# Patient Record
Sex: FEMALE | Race: ASIAN | NOT HISPANIC OR LATINO | Employment: UNEMPLOYED | URBAN - METROPOLITAN AREA
[De-identification: names, ages, dates, MRNs, and addresses within clinical notes are randomized per-mention and may not be internally consistent; named-entity substitution may affect disease eponyms.]

---

## 2023-06-08 ENCOUNTER — LAB REQUISITION (OUTPATIENT)
Dept: LAB | Facility: CLINIC | Age: 23
End: 2023-06-08

## 2023-06-08 DIAGNOSIS — Z34.90 ENCOUNTER FOR SUPERVISION OF NORMAL PREGNANCY, UNSPECIFIED, UNSPECIFIED TRIMESTER: ICD-10-CM

## 2023-06-08 LAB
BASOPHILS # BLD AUTO: 0 10E3/UL (ref 0–0.2)
BASOPHILS NFR BLD AUTO: 0 %
EOSINOPHIL # BLD AUTO: 0.2 10E3/UL (ref 0–0.7)
EOSINOPHIL NFR BLD AUTO: 3 %
ERYTHROCYTE [DISTWIDTH] IN BLOOD BY AUTOMATED COUNT: 12.6 % (ref 10–15)
HCT VFR BLD AUTO: 37.8 % (ref 35–47)
HGB BLD-MCNC: 12.4 G/DL (ref 11.7–15.7)
IMM GRANULOCYTES # BLD: 0 10E3/UL
IMM GRANULOCYTES NFR BLD: 0 %
LYMPHOCYTES # BLD AUTO: 2.2 10E3/UL (ref 0.8–5.3)
LYMPHOCYTES NFR BLD AUTO: 29 %
MCH RBC QN AUTO: 28.7 PG (ref 26.5–33)
MCHC RBC AUTO-ENTMCNC: 32.8 G/DL (ref 31.5–36.5)
MCV RBC AUTO: 88 FL (ref 78–100)
MONOCYTES # BLD AUTO: 0.5 10E3/UL (ref 0–1.3)
MONOCYTES NFR BLD AUTO: 7 %
NEUTROPHILS # BLD AUTO: 4.6 10E3/UL (ref 1.6–8.3)
NEUTROPHILS NFR BLD AUTO: 61 %
NRBC # BLD AUTO: 0 10E3/UL
NRBC BLD AUTO-RTO: 0 /100
PLATELET # BLD AUTO: 288 10E3/UL (ref 150–450)
RBC # BLD AUTO: 4.32 10E6/UL (ref 3.8–5.2)
WBC # BLD AUTO: 7.6 10E3/UL (ref 4–11)

## 2023-06-08 PROCEDURE — 87340 HEPATITIS B SURFACE AG IA: CPT | Performed by: STUDENT IN AN ORGANIZED HEALTH CARE EDUCATION/TRAINING PROGRAM

## 2023-06-08 PROCEDURE — 87086 URINE CULTURE/COLONY COUNT: CPT | Performed by: STUDENT IN AN ORGANIZED HEALTH CARE EDUCATION/TRAINING PROGRAM

## 2023-06-08 PROCEDURE — 86762 RUBELLA ANTIBODY: CPT | Performed by: STUDENT IN AN ORGANIZED HEALTH CARE EDUCATION/TRAINING PROGRAM

## 2023-06-08 PROCEDURE — 87389 HIV-1 AG W/HIV-1&-2 AB AG IA: CPT | Performed by: STUDENT IN AN ORGANIZED HEALTH CARE EDUCATION/TRAINING PROGRAM

## 2023-06-08 PROCEDURE — 86850 RBC ANTIBODY SCREEN: CPT | Performed by: STUDENT IN AN ORGANIZED HEALTH CARE EDUCATION/TRAINING PROGRAM

## 2023-06-08 PROCEDURE — 86901 BLOOD TYPING SEROLOGIC RH(D): CPT | Performed by: STUDENT IN AN ORGANIZED HEALTH CARE EDUCATION/TRAINING PROGRAM

## 2023-06-08 PROCEDURE — 86803 HEPATITIS C AB TEST: CPT | Performed by: STUDENT IN AN ORGANIZED HEALTH CARE EDUCATION/TRAINING PROGRAM

## 2023-06-08 PROCEDURE — 80081 OBSTETRIC PANEL INC HIV TSTG: CPT | Performed by: STUDENT IN AN ORGANIZED HEALTH CARE EDUCATION/TRAINING PROGRAM

## 2023-06-09 LAB
ABO/RH(D): NORMAL
ANTIBODY SCREEN: NEGATIVE
HBV SURFACE AG SERPL QL IA: NONREACTIVE
HCV AB SERPL QL IA: NONREACTIVE
HIV 1+2 AB+HIV1 P24 AG SERPL QL IA: NONREACTIVE
RPR SER QL: NONREACTIVE
RUBV IGG SERPL QL IA: 25.1 INDEX
RUBV IGG SERPL QL IA: POSITIVE
SPECIMEN EXPIRATION DATE: NORMAL

## 2023-06-10 LAB — BACTERIA UR CULT: NO GROWTH

## 2023-06-20 ENCOUNTER — MEDICAL CORRESPONDENCE (OUTPATIENT)
Dept: HEALTH INFORMATION MANAGEMENT | Facility: CLINIC | Age: 23
End: 2023-06-20

## 2023-08-04 ENCOUNTER — TRANSFERRED RECORDS (OUTPATIENT)
Dept: HEALTH INFORMATION MANAGEMENT | Facility: CLINIC | Age: 23
End: 2023-08-04
Payer: COMMERCIAL

## 2023-08-07 ENCOUNTER — TRANSCRIBE ORDERS (OUTPATIENT)
Dept: MATERNAL FETAL MEDICINE | Facility: CLINIC | Age: 23
End: 2023-08-07

## 2023-08-07 ENCOUNTER — PRE VISIT (OUTPATIENT)
Dept: MATERNAL FETAL MEDICINE | Facility: CLINIC | Age: 23
End: 2023-08-07
Payer: COMMERCIAL

## 2023-08-07 DIAGNOSIS — O26.90 PREGNANCY RELATED CONDITION, ANTEPARTUM: Primary | ICD-10-CM

## 2023-08-09 ENCOUNTER — OFFICE VISIT (OUTPATIENT)
Dept: MATERNAL FETAL MEDICINE | Facility: CLINIC | Age: 23
End: 2023-08-09
Attending: OBSTETRICS & GYNECOLOGY
Payer: COMMERCIAL

## 2023-08-09 ENCOUNTER — MEDICAL CORRESPONDENCE (OUTPATIENT)
Dept: HEALTH INFORMATION MANAGEMENT | Facility: CLINIC | Age: 23
End: 2023-08-09

## 2023-08-09 ENCOUNTER — HOSPITAL ENCOUNTER (OUTPATIENT)
Dept: ULTRASOUND IMAGING | Facility: CLINIC | Age: 23
Discharge: HOME | End: 2023-08-09
Attending: OBSTETRICS & GYNECOLOGY
Payer: COMMERCIAL

## 2023-08-09 VITALS — DIASTOLIC BLOOD PRESSURE: 61 MMHG | HEART RATE: 68 BPM | SYSTOLIC BLOOD PRESSURE: 108 MMHG

## 2023-08-09 DIAGNOSIS — O26.90 PREGNANCY RELATED CONDITION, ANTEPARTUM: ICD-10-CM

## 2023-08-09 DIAGNOSIS — O28.3 ABNORMAL PRENATAL ULTRASOUND: ICD-10-CM

## 2023-08-09 DIAGNOSIS — O35.9XX0 FETAL ABNORMALITY AFFECTING MANAGEMENT OF MOTHER, SINGLE OR UNSPECIFIED FETUS: Primary | ICD-10-CM

## 2023-08-09 DIAGNOSIS — O36.5920 POOR CLINICAL FETAL GROWTH IN SECOND TRIMESTER, SINGLE OR UNSPECIFIED FETUS: Primary | ICD-10-CM

## 2023-08-09 DIAGNOSIS — O28.3 ABNORMAL PRENATAL ULTRASOUND: Primary | ICD-10-CM

## 2023-08-09 DIAGNOSIS — O35.9XX0 FETAL ABNORMALITY AFFECTING MANAGEMENT OF MOTHER, SINGLE OR UNSPECIFIED FETUS: ICD-10-CM

## 2023-08-09 PROCEDURE — 87798 DETECT AGENT NOS DNA AMP: CPT | Performed by: OBSTETRICS & GYNECOLOGY

## 2023-08-09 PROCEDURE — 88291 CYTO/MOLECULAR REPORT: CPT | Performed by: MEDICAL GENETICS

## 2023-08-09 PROCEDURE — 99204 OFFICE O/P NEW MOD 45 MIN: CPT | Mod: 25 | Performed by: OBSTETRICS & GYNECOLOGY

## 2023-08-09 PROCEDURE — 88271 CYTOGENETICS DNA PROBE: CPT | Performed by: OBSTETRICS & GYNECOLOGY

## 2023-08-09 PROCEDURE — 88275 CYTOGENETICS 100-300: CPT | Mod: XU | Performed by: OBSTETRICS & GYNECOLOGY

## 2023-08-09 PROCEDURE — 76946 ECHO GUIDE FOR AMNIOCENTESIS: CPT | Mod: 26 | Performed by: OBSTETRICS & GYNECOLOGY

## 2023-08-09 PROCEDURE — 59025 FETAL NON-STRESS TEST: CPT

## 2023-08-09 PROCEDURE — 87496 CYTOMEG DNA AMP PROBE: CPT | Mod: XU | Performed by: OBSTETRICS & GYNECOLOGY

## 2023-08-09 PROCEDURE — 76828 ECHO EXAM OF FETAL HEART: CPT | Mod: 26 | Performed by: OBSTETRICS & GYNECOLOGY

## 2023-08-09 PROCEDURE — 81265 STR MARKERS SPECIMEN ANAL: CPT | Performed by: OBSTETRICS & GYNECOLOGY

## 2023-08-09 PROCEDURE — 36415 COLL VENOUS BLD VENIPUNCTURE: CPT | Performed by: OBSTETRICS & GYNECOLOGY

## 2023-08-09 PROCEDURE — 76811 OB US DETAILED SNGL FETUS: CPT | Mod: 26 | Performed by: OBSTETRICS & GYNECOLOGY

## 2023-08-09 PROCEDURE — 76811 OB US DETAILED SNGL FETUS: CPT

## 2023-08-09 PROCEDURE — 76946 ECHO GUIDE FOR AMNIOCENTESIS: CPT

## 2023-08-09 PROCEDURE — 59000 AMNIOCENTESIS DIAGNOSTIC: CPT | Performed by: OBSTETRICS & GYNECOLOGY

## 2023-08-09 PROCEDURE — 96040 HC GENETIC COUNSELING, EACH 30 MINUTES: CPT | Performed by: GENETIC COUNSELOR, MS

## 2023-08-09 PROCEDURE — 59025 FETAL NON-STRESS TEST: CPT | Mod: 26 | Performed by: OBSTETRICS & GYNECOLOGY

## 2023-08-09 NOTE — PROGRESS NOTES
Please see full imaging report from ViewPoint program under imaging tab.    Thank-you for referring your patient for a comprehensive ultrasound.    I discussed the findings on today's ultrasound with the patient. I reviewed the limitations of ultrasound both in detecting aneuploidy and structural abnormalities.  Ultrasound can routinely detect 80-90% of structural abnormalities.  She had low risk cell free fetal DNA for genetic screening this pregnancy.     The findings on today's ultrasound are consistent with fetal growth restriction (FGR). We reviewed that we consider a pregnancy to be affected by FGR when the overall EFW is <10th% or if the abdominal circumference (AC) is < 10th% as they have been found to be equally predictive of SGA. We discussed the potential etiologies of FGR including incorrect dating, constitutional, infectious etiologies (specifically CMV), fetal genetic and structural abnormalities as well as placental and umbilical cord abnormalities.      Her dating was reviewed. She had low risk NIPT for genetic screening. She is otherwise healthy and has no significant medical problems. There is no family history of congential anomalies or genetic syndromes. She does report a fever and body chills early in pregnancy but did not receive a specific diagnosis.    We reviewed the possible/recommended work up for FGR. I reviewed the limitations of ultrasound and we discussed the availability of amniocentesis for the precise diagnosis of chromosomal abnormalities as well as infectious studies including the associated procedure-related risk of pregnancy loss of approximately 1/400. We also reviewed the availability of cell free DNA. After counseling the patient declined invasive testing but did opt for cell free fetal DNA. CMV serologies are not recommended in the absence of risk factors/ultrasound findings but CMV PCR could be sent if amniocentesis is pursued. Additionally, if a patient delivers at <34  weeks for placental insufficiency, including FGR, testing for antiphospholipid antibody syndrome is recommended with beta 2 glycoprotein IgG & IgM, cardiolipin antibody IgG & IgM and lupus anticoagulant.     We reviewed that regardless of the etiology of FGR, we recommend additional monitoring due to the increased risk for stillbirth and that the ultimate goal of management rests on balancing the risk of stillbirth with the risks of prematurity.  The recommended surveillance and management of pregnancies complicated by FGR includes serial assessment of fetal growth (every 3 weeks) in addition to weekly UA Dopplers and  surveillance. Timing of delivery will depend on Doppler findings, amniotic fluid, fetal monitoring, and interval fetal growth; we will make specific recommendations as the pregnancy progresses. We reviewed fetal kick counts and calling guidelines.    BP was checked and was normal today. She does not have any history of chronic hypertension.     We also discussed findings on today's US of a ventricular septal defect.  We discussed that an isolated VSD carries an excellent prognosis with many defects closing on their own.  We did discuss the recommendation for fetal echocardiography with pediatric cardiology to rule out associated anomalies.  We also discussed that VSD can be associated with chromosomal malformations, specifically trisomy 21.     Additionally on today s ultrasound we see a single umbilical artery (SUA) or 2 vessel umbilical cord. We discussed that a normal umbilical cord is made up of 3 vessels - 2 arteries and 1 vein. In a 2 vessel umbilical cord there is atrophy or agenesis of one of the arteries. We see this in about 1% of all pregnancies and in up to 4.6% of all twin pregnancies. It can be seen in conjunction with other structural anomalies, most commonly renal and cardiac, which confer a higher risk of aneuploidy. The fetal heart and kidneys were normal in appearance on  today's ultrasound.  SUA has been associated with an increased risk of fetal growth restriction and stillbirth.    We discussed the availability of amniocentesis for the precise diagnosis of chromosomal abnormalities, especially given the combination today of FGR, VSD and a two vessel cord. We also reviewed the availability of cell free fetal DNA and its limitations. After counseling the patient desires amniocentesis, which was performed without difficulty and yielded 50 cc of clear fluid. Fluid was sent for both genetic evaluation as well as CMV and toxoplasmosis.     Serial ultrasounds to assess fetal growth are recommended every 3 weeks with weekly UA Dopplers and  surveillance with MFM.    Fetal echocardiogram with pediatric cardiology is ordered due to VSD in combination with FGR and 2VC.    Return to primary provider for continued prenatal care.    If you have questions regarding today's evaluation or if we can be of further service, please contact the M[facility]Fetal Medicine Center.    At the end of our discussion Ms. Allen voiced understanding of the plan of care and stated all of her questions had been answered. Thank you for the opportunity to participate in the care of your patient. Please do not hesitate to contact us if you may have any questions or concerns.     I spent a total of 45 minutes on the date of this encounter including preparing to see the patient (reviewing medical records/tests), counseling and discussing the plan of care, documenting the visit in the electronic medical record, and communicating with other health care professionals and/or care coordination.    **Fetal anomalies may be present but not detected**    Porsche Briseno MD  Maternal Fetal Medicine

## 2023-08-09 NOTE — PROGRESS NOTES
[facility] Maternal [facility]etal [facility]  Genetic Counseling Consult    Patient:  Monique Allen YOB: 2000   Date of Service:  23   MRN: [id number]    Monique was seen at the [facility] for genetic consultation. The indication for genetic counseling is  newly-identified severe growth restriction . The patient was accompanied to this visit by their partner, Fatemeh..     The session was conducted in English.      IMPRESSION/ PLAN   1. Monique had a genetic consultation today. Today she has elected to pursue genetic amniocentesis and consent was obtained. The following was ordered on the prenatal sample: FISH, chromosomal microarray, and PCR for CMV and toxoplasmosis. Results are expected within 24-48 for the FISH, 10-14 days for the microarray, and 5-7 days for the infection studies. All results will be available in Epic. We will contact her to discuss the results, and a copy will be forwarded to the referring OB provider. Monique provided verbal permission for detailed results to be left on her voicemail. She is aware of the predicted fetal sex (male).    2. Monique also underwent an level II ultrasound today. Please see the ultrasound report for additional details.     3. Given the concern for a possible VSD today as well as the growth restriction, Monique will have a fetal echocardiogram and serial growth ultrasound with [facility].     4. We discussed that some couples may choose not to continue a pregnancy based on ultrasound findings or genetic testing results. We reviewed the current gestation age limit at Max (23w6d).     PREGNANCY HISTORY   /Parity:       Monique's pregnancy history is significant for:   This is Monique's first pregnancy.     CURRENT PREGNANCY   Current Age: 23 year old   Age at Delivery: 23 year old  BOAZ: 2023, by Last Menstrual Period                                   Gestational Age:  22w5d  This pregnancy is a single gestation.   This pregnancy was conceived spontaneously.    MEDICAL HISTORY   Monique s reported medical history is not expected to impact pregnancy management or risks to fetal development.       FAMILY HISTORY   A three-generation pedigree was not obtained due to the nature of the visit. However, the couple denies a family history of multiple miscarriages, stillbirths, birth defects, intellectual disabilities, known genetic conditions, and consanguinity.          RISK ASSESSMENT   I met with Monique today at the request of Dr. Briseno due to the fetal growth restriction seen on her comprehensive ultrasound today.  We discussed conditions with which growth restriction can be associated. Monique has already had a low-risk non-invasive prenatal screen for trisomies 21, 18, and 13, sex chromosome conditions, and triploidy. We discussed the rare possibility of a false negative, particularly for triploidy.      Triploidy is a severe chromosome difference characterized by the presence of a full extra set of chromosomes (69 rather than 46 chromosomes). This condition most commonly presents with significant fetal malformations. However, the digynic type of triploidy can occasionally present with severe growth restriction as the first identifiable feature of the condition, sometimes with no concurrent anomalies. Given the low-risk NIPT, this is less likely. There are many other genetic conditions, such as microdeletion syndromes, that can be associated with growth restriction. We reviewed that some studies have indicated approximately a 5-10% yield for chromosomal microarray with no other fetal anomalies.      Rafael SEYMOUR, Jose M, Ana M, Ct L, Natalie F. Chromosomal Microarray Analysis in Fetuses with Growth Restriction and Normal Karyotype: A Systematic Review and [place]-Analysis. Fetal Diagn Ther. 2018;44(1):1-9. doi: 10.1159/719468930. Epub 2017 Sep 9. PMID: 66754670.      We also reviewed that the fetal growth restriction, if genetic, could be associated with a large number of single gene conditions. Many of these conditions would often present with other anomalies. We reviewed the option of proceeding with whole exome sequencing for further evaluation. Some studies have indicated a relatively high incremental yield with whole exome sequencing after normal microarray. However, these studies are based on a very small number of cases and it is difficult to know how well we can extrapolate this potential yield at this time.    Ana M, Chica RJ, Alexis E, Suzan J, Rafael SEYMOUR. Diagnostic yield of exome sequencing in isolated fetal growth restriction: Systematic review and meta-analysis. Prenat Diagn. 2023 May;43(5):596-604. doi: 10.1002/pd.6339. Epub 2023 Mar 25. PMID: 43451152.    There are other factors, such as maternal factors, placental function, and certain infections (CMV, toxoplasmosis) that can be associated with growth restriction. We reviewed that CMV and toxoplasmosis infections often present with other features (such as calcifications), though this remains a possibility.     TESTING OPTIONS     We discussed the following diagnostic options:   Amniocentesis  Invasive diagnostic procedure done after 15 weeks gestation  The procedure collects a small sample of amniotic fluid for the purpose of chromosomal testing and/or other genetic testing  Diagnostic result; more than 99% sensitivity for fetal chromosome abnormalities  Testing for AFP in the amniotic fluid can test for open neural tube defects    Monique has elected to pursue genetic amniocentesis today.    Genetic Amniocentesis  - This is an invasive procedure typically performed at 15 weeks or later, through which amniotic fluid is obtained for the purpose of chromosome analysis and/or other prenatal genetic analysis.  - The timing and option of amniocentesis is dependent on the fusion of the chorionic and  "amniotic membranes. This fusion typically occurs around 15-16 weeks gestation. In the case of aneuploidy, this fusion can be delayed.  - Amniocentesis is considered a diagnostic test for chromosome problems during pregnancy.  - The risk of pregnancy loss associated with amniocentesis is generally estimated to be 1/500 or less.  - Amniotic fluid AFP measurement can be done to screen for the possibility of open neural tube or ventral defects.     We reviewed the amniocentesis procedure consent form as well as the genetic testing consent form. Both can be found scanned in the patient's chart under the \"Media\" tab. The following was ordered on the prenatal sample: FISH preliminary analysis, chromosomal microarray, and PCR infection studies.      We discussed that FISH results are considered preliminary with chromosome analysis or a microarray result determined final. There is a less than 1% chance for FISH results to be discordant from the final results. We often encourage patients to make pregnancy decisions based on those final results but understand that some patients may feel comfortable making those after FISH given the context.     Fluorescence In Situ Hybridization (FISH) analysis is a preliminary targeted chromosome analysis that determines how many copies of chromosome 13, 18, 21, X, and Y are present in the prenatal sample. FISH cannot detect all chromosomal differences and cannot exclude mosaicism.     Microarray testing involves looking for small extra (duplications) or missing (deletions) pieces of DNA within the chromosomes.  Chromosomal deletions and duplications may cause problems with an individual's health and development including learning disabilities, developmental delays, growth issues, physical differences, and psychiatric challenges. The specific symptoms would depend on the size and gene content of the specific chromosomal region involved.  Microarray testing can also identify whether there are " areas within a pair of chromosomes that are too similar to each other, which could indicate that the individual's parents may be related to each other such as cousins, or could represent a phenomenon known as uniparental disomy (UPD) which is where an individual inherits more of a specific chromosome region from one parent than the other parent.  Depending on the chromosome(s) involved, this  genetic similarity  can sometimes lead to growth, developmental and/or physical problems for the individual. Sometimes a microarray can uncover additional incidental findings, such as carrier status, that may require further evaluation.     We reviewed the benefits, limitations, and possible results from a microarray analysis which can include:    Negative: No clinically significant deletions or duplications were identified. This may not rule out a genetic cause for the fetal features.    Positive: A deletion, duplication, or genetic similarity was identified that may be associated with a particular set of symptoms or known syndrome.     Variant of uncertain significance (VUS): A deletion or duplication was identified, but it is not known if it explains the clinical features or it is is normal variation.    If the microarray returns a VUS, parental testing may be recommended to help clarify pathogenicity.    Briefly discussed due to low yield:    Prenatal Whole Exome Sequencing involves an analysis of nearly all of the exons (protein-coding regions), but not the introns (non-coding regions) of all of our genes, using the ultrasound findings as a guide. OLGA differs from a multi-gene panel as the lab reports on the variants with the best fit for the clinical indication instead of the clinician picking a group of genes they believe to be the best fit. Benefits include finding an underlying genetic etiology for the ultrasound findings, streamlining medical care after delivery if the pregnancy is ongoing, and helping to provide  reproductive planning information for the family. Challenges include the high rate of uncertain results, receiving unexpected results for which a family is unprepared (this is likely a low chance), and the continued possibility of a genetic etiology even with negative results. If the couple opts to proceed with testing at a later date, we will discuss additional implications. If the VSD is confirmed, it is possible that the yield would be higher than with growth restriction alone.       These results will be available in [employer].  We will contact her to discuss the results, and a copy will be forwarded to the office of the referring OB provider.      It was a pleasure to be involved with Monique s care. Face-to-face time of the meeting was 30 minutes.    April Doll GC, MS, [facility]  Certified and [place] Licensed Genetic Counselor  [facility]  Maternal Fetal Medicine  Office: [phone]  [facility]: [phone]   Fax: [phone]  [facility]

## 2023-08-09 NOTE — NURSING NOTE
Patient presents to [facility] for L2 at 22w5d due to FGR, 2VC on outside US. Denies LOF, vaginal bleeding, contractions. SBAR given to MFM MD, see their note in Epic.     FGR noted on US - NST performed. See flowsheet. BP WNL.     Amniocentesis d/t FGR. Saw [facility], see their dictation.  After consent signed and TimeOut completed, Dr. Briseno/Dr. Gar withdrew adequate fluid x1 transabdominal pass.    Patient reports no pain.  Pt is RH positive.  MD reviewed blood type and screen and Rhogam not indicated.  Discharge teaching completed and questions answered.  Pt discharged ambulatory and stable.   Lab alerted by calling phone number on amnio work-aid for UR site.  Cytogenetics notified of specimen.  Specimen transported to main lab, warm hand-off completed.     Weekly US with MFM and fetal echo scheduled. Understands to continue to follow with primary.    Meghan Wilson RN

## 2023-08-09 NOTE — PATIENT INSTRUCTIONS
As part of your visit in Maternal Fetal Medicine, you elected to have a genetic amniocentesis, an invasive diagnostic procedure. The following information was discussed.:  Amniocentesis is an invasive test that can diagnose these types of structural chromosome abnormalities with greater than 99% accuracy.  In addition, amniotic fluid alpha fetoprotein levels would be measured to screen for neural tube and abdominal wall defects.    The risk of pregnancy loss association with amniocentesis is generally estimated to be 1/500  or less.  Mild cramping is very common. It usually goes away within a few hours. You may take Acetaminophen (Tylenol ) for this if needed  Avoid strenuous activities for the rest of the day after the Amniocentesis is done. This includes heavy lifting, jogging or other exercise.  You should call your regular obstetric (OB) care provider if you have:  Heavy bleeding  Clear fluid (like water) leaking from your vagina  Severe abdominal (belly) pain  Flu-like symptoms within two weeks of the test. These include chills, muscle aches or a fever over 100.4 F (38 C) (under the tongue).  The following testing was ordered on the amniotic fluid sample:  FISH preliminary analysis - results available in 24-48 hours.  Microarray analysis - results typically available in 7-10 days. Testing may take up to 14 days.  Maternal Cell Contamination studies are performed on amnio specimens with microarray  PCR for CMV and toxoplasmosis  Results are not guaranteed  Results will be communicated to you, forwarded to your primary OB provider, and available in [facility].

## 2023-08-10 ENCOUNTER — TELEPHONE (OUTPATIENT)
Dept: MATERNAL FETAL MEDICINE | Facility: HOSPITAL | Age: 23
End: 2023-08-10
Payer: COMMERCIAL

## 2023-08-10 LAB — INTERPRETATION: NORMAL

## 2023-08-10 NOTE — TELEPHONE ENCOUNTER
August 10, 2023    Attempted to call Monique feldman to discuss the results of the FISH analysis performed on amniocytes. Results are normal. Will send valuklik message asking for call back.    April Doll MS, [facility]  Licensed Genetic Counselor  [facility]  Maternal Fetal Medicine  [email or web address]  [phone]

## 2023-08-11 NOTE — TELEPHONE ENCOUNTER
"August 11, 2023    Again attempted to reach Monique with the normal FISH results x2. Phone does not go through.     Called the number for Subbarshin. He answered and was able to put Monique on the line on speakerphone.     We reviewed the results of the fluorescence in situ hybridization (FISH) results for chromosomes 13, 18, 21, X and Y from her amniocentesis.     Results are consistent with a normal male. There were two signals for chromosomes 21, 18, 13, and the sex chromosomes.  These results, although reassuring, are not the final results. The microarray results will be available in 10-14 days.     The couple again asked about further testing. I reviewed the option of whole exome sequencing which would likely have a low yield, but could be an option, especially if covered by insurance. I will check with the couple's insurance company to see if they would be covered. Unfortunately, the fetal echocardiogram is scheduled in September, so we may not have final information regarding the VSD, but could potentially initiate testing with a \"possible\" VSD and growth restriction.     April Doll MS, [facility]  Licensed Genetic Counselor  [facility]  Maternal Fetal Medicine  [email or web address]  [phone]    "

## 2023-08-12 LAB — T GONDII DNA SPEC QL NAA+PROBE: NOT DETECTED

## 2023-08-13 ENCOUNTER — HEALTH MAINTENANCE LETTER (OUTPATIENT)
Age: 23
End: 2023-08-13

## 2023-08-13 LAB — CMV DNA SPEC QL NAA+PROBE: NOT DETECTED

## 2023-08-14 ENCOUNTER — TELEPHONE (OUTPATIENT)
Dept: MATERNAL FETAL MEDICINE | Facility: CLINIC | Age: 23
End: 2023-08-14
Payer: COMMERCIAL

## 2023-08-14 NOTE — TELEPHONE ENCOUNTER
August 14, 2023    I called Monique to discuss the results of the CMV and toxoplasmosis PCR performed on amniotic fluid.     The results are NOT DETECTED for both, indicating the fetal growth restriction is unlikely to be associated with these infections.     Microarray still pending.     April Doll MS, [facility]  Licensed Genetic Counselor  [facility]  Maternal Fetal Medicine  [email or web address]  [phone]

## 2023-08-15 ENCOUNTER — OFFICE VISIT (OUTPATIENT)
Dept: MATERNAL FETAL MEDICINE | Facility: CLINIC | Age: 23
End: 2023-08-15
Attending: OBSTETRICS & GYNECOLOGY
Payer: COMMERCIAL

## 2023-08-15 ENCOUNTER — HOSPITAL ENCOUNTER (OUTPATIENT)
Dept: ULTRASOUND IMAGING | Facility: CLINIC | Age: 23
Discharge: HOME | End: 2023-08-15
Attending: OBSTETRICS & GYNECOLOGY
Payer: COMMERCIAL

## 2023-08-15 DIAGNOSIS — O36.5920 POOR CLINICAL FETAL GROWTH IN SECOND TRIMESTER, SINGLE OR UNSPECIFIED FETUS: ICD-10-CM

## 2023-08-15 PROCEDURE — 59025 FETAL NON-STRESS TEST: CPT | Mod: 26 | Performed by: OBSTETRICS & GYNECOLOGY

## 2023-08-15 PROCEDURE — 76820 UMBILICAL ARTERY ECHO: CPT

## 2023-08-15 PROCEDURE — 76820 UMBILICAL ARTERY ECHO: CPT | Mod: 26 | Performed by: OBSTETRICS & GYNECOLOGY

## 2023-08-15 PROCEDURE — 59025 FETAL NON-STRESS TEST: CPT

## 2023-08-15 PROCEDURE — 76815 OB US LIMITED FETUS(S): CPT | Mod: 26 | Performed by: OBSTETRICS & GYNECOLOGY

## 2023-08-15 NOTE — PROGRESS NOTES
Please refer to ultrasound report under 'Imaging' Studies of 'Chart Review' tabs.    Ankur Martinez M.D.

## 2023-08-15 NOTE — NURSING NOTE
NST Performed due to FGR.   reviewed efm tracing. See NST/BPP Doc Flowsheet tab. Difficulty monitoring due to GA. VD noted. Monitoring stopped and US completed. Patient put back on monitor and tracing was appropriate for GA with no decels noted. Plan for follow up in 1 week.    Meghan Wilson RN

## 2023-08-16 LAB — MATERNAL CELL CONTAM, MATERNAL SPECIMEN: NORMAL

## 2023-08-17 ENCOUNTER — TELEPHONE (OUTPATIENT)
Dept: MATERNAL FETAL MEDICINE | Facility: CLINIC | Age: 23
End: 2023-08-17
Payer: COMMERCIAL

## 2023-08-17 LAB
CHROM ANALY RESULT (ISCN): NORMAL
MATERNAL CELL CONTAM SPEC: NORMAL
SERVICE CMNT-IMP: YES

## 2023-08-17 NOTE — TELEPHONE ENCOUNTER
August 17, 2023    I called Monique to discuss the results of the chromosomal microarray performed on amniocytes.     Results were consistent with a NORMAL male array pattern. There were no clinically significant copy number changes or regions of homozygosity detected. Maternal cell contamination studies were negative; a single fetal genotype was detected.     Monique understands that a normal microarray cannot rule out all genetic conditions.     I encouraged her to reach out to me with any questions or concerns.     We did review that if the cardiology team confirms the VSD, we could [place] back to further single gene testing. However, at this time there is no further testing I would recommend given the likely low yield for a multigene panel or exome and the high possibility of an uncertain finding. I will reach out to [facility] to ensure that there is sample remaining in the event that further testing is indicated.     April Doll MS, [facility]  Licensed Genetic Counselor  [facility]  Maternal Fetal Medicine  [email or web address]  [phone]

## 2023-08-22 ENCOUNTER — OFFICE VISIT (OUTPATIENT)
Dept: MATERNAL FETAL MEDICINE | Facility: CLINIC | Age: 23
End: 2023-08-22
Attending: OBSTETRICS & GYNECOLOGY
Payer: COMMERCIAL

## 2023-08-22 ENCOUNTER — HOSPITAL ENCOUNTER (OUTPATIENT)
Facility: CLINIC | Age: 23
End: 2023-08-22
Admitting: OBSTETRICS & GYNECOLOGY
Payer: COMMERCIAL

## 2023-08-22 ENCOUNTER — HOSPITAL ENCOUNTER (OUTPATIENT)
Dept: ULTRASOUND IMAGING | Facility: CLINIC | Age: 23
Discharge: HOME | End: 2023-08-22
Attending: OBSTETRICS & GYNECOLOGY
Payer: COMMERCIAL

## 2023-08-22 ENCOUNTER — HOSPITAL ENCOUNTER (OUTPATIENT)
Facility: CLINIC | Age: 23
Discharge: HOME | End: 2023-08-22
Attending: OBSTETRICS & GYNECOLOGY | Admitting: OBSTETRICS & GYNECOLOGY
Payer: COMMERCIAL

## 2023-08-22 VITALS — RESPIRATION RATE: 16 BRPM | TEMPERATURE: 98.8 F

## 2023-08-22 VITALS — DIASTOLIC BLOOD PRESSURE: 74 MMHG | HEART RATE: 81 BPM | SYSTOLIC BLOOD PRESSURE: 114 MMHG

## 2023-08-22 DIAGNOSIS — O36.5920 POOR CLINICAL FETAL GROWTH IN SECOND TRIMESTER, SINGLE OR UNSPECIFIED FETUS: ICD-10-CM

## 2023-08-22 PROBLEM — Z36.89 ENCOUNTER FOR TRIAGE IN PREGNANT PATIENT: Status: ACTIVE | Noted: 2023-08-22

## 2023-08-22 PROCEDURE — 76815 OB US LIMITED FETUS(S): CPT | Mod: 26 | Performed by: OBSTETRICS & GYNECOLOGY

## 2023-08-22 PROCEDURE — 59025 FETAL NON-STRESS TEST: CPT | Mod: 26 | Performed by: OBSTETRICS & GYNECOLOGY

## 2023-08-22 PROCEDURE — 76820 UMBILICAL ARTERY ECHO: CPT | Mod: 26 | Performed by: OBSTETRICS & GYNECOLOGY

## 2023-08-22 PROCEDURE — 59025 FETAL NON-STRESS TEST: CPT

## 2023-08-22 PROCEDURE — 76820 UMBILICAL ARTERY ECHO: CPT

## 2023-08-22 PROCEDURE — G0463 HOSPITAL OUTPT CLINIC VISIT: HCPCS

## 2023-08-22 RX ORDER — LIDOCAINE 40 MG/G
CREAM TOPICAL
Status: DISCONTINUED | OUTPATIENT
Start: 2023-08-22 | End: 2023-08-22 | Stop reason: HOSPADM

## 2023-08-22 ASSESSMENT — ACTIVITIES OF DAILY LIVING (ADL): ADLS_ACUITY_SCORE: 31

## 2023-08-22 NOTE — DISCHARGE INSTRUCTIONS
Discharge Instruction for Undelivered Patients      You were seen for: Fetal Assessment  We Consulted: Dr. Briseno, Dr. Valadez  You had (Test or Medicine): fetal and uterine monitoring     Diet:   Drink 8 to 12 glasses of liquids (milk, juice, water) every day.  You may eat meals and snacks.     Activity:  Call your doctor or nurse midwife if your baby is moving less than usual.     Call your provider if you notice:  Swelling in your face or increased swelling in your hands or legs.  Headaches that are not relieved by Tylenol (acetaminophen).  Changes in your vision (blurring: seeing spots or stars.)  Nausea (sick to your stomach) and vomiting (throwing up).   Weight gain of 5 pounds or more per week.  Heartburn that doesn't go away.  Signs of bladder infection: pain when you urinate (use the toilet), need to go more often and more urgently.  The bag of meléndez (rupture of membranes) breaks, or you notice leaking in your underwear.  Bright red blood in your underwear.  Abdominal (lower belly) or stomach pain.  For first baby: Contractions (tightening) less than 5 minutes apart for one hour or more.  Second (plus) baby: Contractions (tightening) less than 10 minutes apart and getting stronger.  *If less than 34 weeks: Contractions (tightening) more than 6 times in one hour.  Increase or change in vaginal discharge (note the color and amount)  Other:     Follow-up:  As scheduled in the clinic

## 2023-08-22 NOTE — NURSING NOTE
Patient reports positive fetal movement, no pain, no contractions, leaking of fluid, or bleeding.  NST Performed due to FGR.   called to room due to decels.  Difficult to trace during position changes but audible decels as low as 90's.  Dr Martinez reviewed efm tracing, plan to send to [facility] after ultrasound for extended monitoring. See NST/BPP Doc Flowsheet tab. SBAR to yocasta Segura RN.  Dr Martinez to consult with Dr Briseno and primary OB.

## 2023-08-22 NOTE — PLAN OF CARE
Data: Patient presented to the Birthplace at 0954.   Reason for maternal/fetal assessment per patient is extended monitoring from clinic. Patient is a . Prenatal record reviewed.      OB History    Para Term  AB Living   1 0 0 0 0 0   SAB IAB Ectopic Multiple Live Births   0 0 0 0 0      # Outcome Date GA Lbr Kenroy/2nd Weight Sex Delivery Anes PTL Lv   1 Current               Medical History: No past medical history on file.. Gestational Age 24w4d. VSS. Fetal movement present. Patient denies cramping, backache, vaginal discharge, pelvic pressure, UTI symptoms, GI problems, bloody show, vaginal bleeding, edema, headache, visual disturbances, epigastric or URQ pain, abdominal pain, rupture of membranes. Support persons present.  Action: Verbal consent for EFM. Triage assessment completed. EFM applied for extended monitoring for possible decels in the clinic. Patient instructed to report change in fetal movement, vaginal leaking of fluid or bleeding, abdominal pain, or any concerns related to the pregnancy to her nurse/physician.   Response: Dr. Briseno informed of patient arrival and status. Plan per provider is discharge home. Patient verbalized understanding of education and verbalized agreement with plan. Discharged ambulatory at 1255.

## 2023-09-01 ENCOUNTER — HOSPITAL ENCOUNTER (OUTPATIENT)
Dept: CARDIOLOGY | Facility: CLINIC | Age: 23
Discharge: HOME | End: 2023-09-01
Attending: OBSTETRICS & GYNECOLOGY
Payer: COMMERCIAL

## 2023-09-01 ENCOUNTER — OFFICE VISIT (OUTPATIENT)
Dept: CARDIOLOGY | Facility: CLINIC | Age: 23
End: 2023-09-01

## 2023-09-01 ENCOUNTER — HOSPITAL ENCOUNTER (OUTPATIENT)
Dept: ULTRASOUND IMAGING | Facility: CLINIC | Age: 23
Discharge: HOME | End: 2023-09-01
Attending: OBSTETRICS & GYNECOLOGY
Payer: COMMERCIAL

## 2023-09-01 ENCOUNTER — OFFICE VISIT (OUTPATIENT)
Dept: MATERNAL FETAL MEDICINE | Facility: CLINIC | Age: 23
End: 2023-09-01
Attending: OBSTETRICS & GYNECOLOGY
Payer: COMMERCIAL

## 2023-09-01 DIAGNOSIS — O36.5920 POOR CLINICAL FETAL GROWTH IN SECOND TRIMESTER, SINGLE OR UNSPECIFIED FETUS: ICD-10-CM

## 2023-09-01 DIAGNOSIS — O35.BXX0 ANOMALY OF HEART OF FETUS AFFECTING PREGNANCY, ANTEPARTUM, SINGLE OR UNSPECIFIED FETUS: Primary | ICD-10-CM

## 2023-09-01 PROCEDURE — 76816 OB US FOLLOW-UP PER FETUS: CPT

## 2023-09-01 PROCEDURE — 59025 FETAL NON-STRESS TEST: CPT

## 2023-09-01 PROCEDURE — 99204 OFFICE O/P NEW MOD 45 MIN: CPT | Mod: 25 | Performed by: PEDIATRICS

## 2023-09-01 PROCEDURE — 93325 DOPPLER ECHO COLOR FLOW MAPG: CPT

## 2023-09-01 PROCEDURE — 76820 UMBILICAL ARTERY ECHO: CPT | Mod: 26 | Performed by: STUDENT IN AN ORGANIZED HEALTH CARE EDUCATION/TRAINING PROGRAM

## 2023-09-01 PROCEDURE — 76827 ECHO EXAM OF FETAL HEART: CPT | Mod: 26 | Performed by: PEDIATRICS

## 2023-09-01 PROCEDURE — 59025 FETAL NON-STRESS TEST: CPT | Mod: 26 | Performed by: STUDENT IN AN ORGANIZED HEALTH CARE EDUCATION/TRAINING PROGRAM

## 2023-09-01 PROCEDURE — 76816 OB US FOLLOW-UP PER FETUS: CPT | Mod: 26 | Performed by: STUDENT IN AN ORGANIZED HEALTH CARE EDUCATION/TRAINING PROGRAM

## 2023-09-01 PROCEDURE — 76825 ECHO EXAM OF FETAL HEART: CPT | Mod: 26 | Performed by: PEDIATRICS

## 2023-09-01 PROCEDURE — 93325 DOPPLER ECHO COLOR FLOW MAPG: CPT | Mod: 26 | Performed by: PEDIATRICS

## 2023-09-01 NOTE — PROGRESS NOTES
Please see the full imaging report from the ViewPoint program under the imaging tab.    Iman Granda MD  Maternal Fetal Medicine

## 2023-09-01 NOTE — PROGRESS NOTES
Fetal Cardiology Consultation    Patient:  Monique Allen MRN:  [id number]   YOB: 2000 Age:  23 year old   Date of Visit:  2023 PCP:  No Ref-Primary, Physician   BOAZ: 2023, by Last Menstrual Period EGA: 26w0d weeks     Dear Dr. Briseno:    I had the pleasure of seeing Monique Allen at the [facility] in [place] on 2023 in consultation for fetal echocardiography results. She presented today accompanied by her partner. As you know, she is a 23 year old female with suspected fetal cardiac anomaly on obstetrical ultrasound (muscular ventricular septal defect).    The fetal echocardiogram showed a small, oblong midseptal muscular ventricular septal defect, otherwise fetal cardiac anatomy. Normal right and left ventricular size and function without hypertrophy. No evidence of diastolic dysfunction. No pericardial effusion. No arrhythmia.     I reviewed and interpreted the fetal echocardiogram today. I discussed the anatomy, physiology, expected fetal course, and need for post- confirmation. The fetal cardiac anatomy is not expected to be dependent on the ductus arteriosus after birth. The expected post- intervention will depend on confirmation of these findings, and is likely to include observation only.     -- No additional fetal echocardiograms are recommended for this pregnancy.  -- A post- transthoracic echocardiogram is recommended within 1-2 months after delivery as an outpatient in context of a pediatric cardiology appointment.    Thank you for allowing me to participate in Ms. Allen's care. Please don't hesitate to contact me or the Fetal Cardiology team at [facility] with any questions or concerns.     I spent a total of 60 minutes on the date of the encounter doing chart review, patient history, documentation, counseling, and coordinating care.    Christofer Hudson MD  Pediatric  Cardiology  [facility]  Phone [phone]    Review of the result(s) of each unique test - fetal echocardiogram

## 2023-09-01 NOTE — NURSING NOTE
Patient reports positive fetal movement, reports occasional back pain, denies contractions, leaking of fluid, or bleeding.  NST Performed due to FGR.  Dr. Granda reviewed efm tracing. See NST/BPP Doc Flowsheet tab. SBAR given to BREONNA TIAN, see their note in Epic.

## 2023-09-05 ENCOUNTER — OFFICE VISIT (OUTPATIENT)
Dept: MATERNAL FETAL MEDICINE | Facility: CLINIC | Age: 23
End: 2023-09-05
Attending: STUDENT IN AN ORGANIZED HEALTH CARE EDUCATION/TRAINING PROGRAM
Payer: COMMERCIAL

## 2023-09-05 ENCOUNTER — HOSPITAL ENCOUNTER (OUTPATIENT)
Dept: ULTRASOUND IMAGING | Facility: CLINIC | Age: 23
Discharge: HOME | End: 2023-09-05
Attending: STUDENT IN AN ORGANIZED HEALTH CARE EDUCATION/TRAINING PROGRAM
Payer: COMMERCIAL

## 2023-09-05 DIAGNOSIS — O36.5920 POOR CLINICAL FETAL GROWTH IN SECOND TRIMESTER, SINGLE OR UNSPECIFIED FETUS: ICD-10-CM

## 2023-09-05 PROCEDURE — 76820 UMBILICAL ARTERY ECHO: CPT | Mod: 26 | Performed by: STUDENT IN AN ORGANIZED HEALTH CARE EDUCATION/TRAINING PROGRAM

## 2023-09-05 PROCEDURE — 76820 UMBILICAL ARTERY ECHO: CPT

## 2023-09-05 PROCEDURE — 76815 OB US LIMITED FETUS(S): CPT | Mod: 26 | Performed by: STUDENT IN AN ORGANIZED HEALTH CARE EDUCATION/TRAINING PROGRAM

## 2023-09-05 PROCEDURE — 59025 FETAL NON-STRESS TEST: CPT | Mod: 26 | Performed by: STUDENT IN AN ORGANIZED HEALTH CARE EDUCATION/TRAINING PROGRAM

## 2023-09-05 PROCEDURE — 59025 FETAL NON-STRESS TEST: CPT

## 2023-09-05 NOTE — NURSING NOTE
Patient here for NST and limited ultrasound. Patient states feeling active fetal movement. NST Performed due to fetal growth restriction.   reviewed efm tracing. See NST/BPP Doc Flowsheet tab.

## 2023-09-08 ENCOUNTER — HOSPITAL ENCOUNTER (OUTPATIENT)
Dept: ULTRASOUND IMAGING | Facility: CLINIC | Age: 23
Discharge: HOME | End: 2023-09-08
Attending: STUDENT IN AN ORGANIZED HEALTH CARE EDUCATION/TRAINING PROGRAM
Payer: COMMERCIAL

## 2023-09-08 ENCOUNTER — OFFICE VISIT (OUTPATIENT)
Dept: MATERNAL FETAL MEDICINE | Facility: CLINIC | Age: 23
End: 2023-09-08
Attending: STUDENT IN AN ORGANIZED HEALTH CARE EDUCATION/TRAINING PROGRAM
Payer: COMMERCIAL

## 2023-09-08 DIAGNOSIS — O36.5920 POOR CLINICAL FETAL GROWTH IN SECOND TRIMESTER, SINGLE OR UNSPECIFIED FETUS: ICD-10-CM

## 2023-09-08 PROCEDURE — 76820 UMBILICAL ARTERY ECHO: CPT | Mod: 26 | Performed by: OBSTETRICS & GYNECOLOGY

## 2023-09-08 PROCEDURE — 59025 FETAL NON-STRESS TEST: CPT | Mod: 26 | Performed by: OBSTETRICS & GYNECOLOGY

## 2023-09-08 PROCEDURE — 59025 FETAL NON-STRESS TEST: CPT

## 2023-09-08 PROCEDURE — 76815 OB US LIMITED FETUS(S): CPT | Mod: 26 | Performed by: OBSTETRICS & GYNECOLOGY

## 2023-09-08 PROCEDURE — 76815 OB US LIMITED FETUS(S): CPT

## 2023-09-09 NOTE — PROGRESS NOTES
"Please see \"Imaging\" tab under \"Chart Review\" for details of today's ultrasound.    Alexi Vanegas M.D.  Specialist in Maternal-Fetal Medicine     "

## 2023-09-12 ENCOUNTER — OFFICE VISIT (OUTPATIENT)
Dept: MATERNAL FETAL MEDICINE | Facility: CLINIC | Age: 23
End: 2023-09-12
Attending: STUDENT IN AN ORGANIZED HEALTH CARE EDUCATION/TRAINING PROGRAM
Payer: COMMERCIAL

## 2023-09-12 ENCOUNTER — HOSPITAL ENCOUNTER (OUTPATIENT)
Dept: ULTRASOUND IMAGING | Facility: CLINIC | Age: 23
Discharge: HOME | End: 2023-09-12
Attending: STUDENT IN AN ORGANIZED HEALTH CARE EDUCATION/TRAINING PROGRAM
Payer: COMMERCIAL

## 2023-09-12 DIAGNOSIS — O36.5920 POOR CLINICAL FETAL GROWTH IN SECOND TRIMESTER, SINGLE OR UNSPECIFIED FETUS: Primary | ICD-10-CM

## 2023-09-12 DIAGNOSIS — O36.5920 POOR CLINICAL FETAL GROWTH IN SECOND TRIMESTER, SINGLE OR UNSPECIFIED FETUS: ICD-10-CM

## 2023-09-12 PROCEDURE — 99213 OFFICE O/P EST LOW 20 MIN: CPT | Mod: 25 | Performed by: OBSTETRICS & GYNECOLOGY

## 2023-09-12 PROCEDURE — 76820 UMBILICAL ARTERY ECHO: CPT

## 2023-09-12 PROCEDURE — 76820 UMBILICAL ARTERY ECHO: CPT | Mod: 26 | Performed by: OBSTETRICS & GYNECOLOGY

## 2023-09-12 PROCEDURE — 76815 OB US LIMITED FETUS(S): CPT | Mod: 26 | Performed by: OBSTETRICS & GYNECOLOGY

## 2023-09-12 PROCEDURE — 59025 FETAL NON-STRESS TEST: CPT | Mod: 26 | Performed by: OBSTETRICS & GYNECOLOGY

## 2023-09-12 NOTE — NURSING NOTE
Patient reports positive fetal movement, denies contractions, leaking of fluid, or bleeding.  Patient getting her OB care through [facility] and has an appointment on 9/15, last seen in August.  SBAR given to BREONNA TIAN, see their note in Epic.  NST Performed due to FGR with elevated dopplers.  Dr. Rolle reviewed efm tracing. See NST/BPP Doc Flowsheet tab.

## 2023-09-13 ENCOUNTER — HOSPITAL ENCOUNTER (OUTPATIENT)
Dept: ULTRASOUND IMAGING | Facility: CLINIC | Age: 23
Discharge: HOME | End: 2023-09-13
Attending: OBSTETRICS & GYNECOLOGY
Payer: COMMERCIAL

## 2023-09-13 ENCOUNTER — OFFICE VISIT (OUTPATIENT)
Dept: MATERNAL FETAL MEDICINE | Facility: CLINIC | Age: 23
End: 2023-09-13
Attending: OBSTETRICS & GYNECOLOGY
Payer: COMMERCIAL

## 2023-09-13 DIAGNOSIS — O36.5920 POOR CLINICAL FETAL GROWTH IN SECOND TRIMESTER, SINGLE OR UNSPECIFIED FETUS: ICD-10-CM

## 2023-09-13 DIAGNOSIS — O36.5920 POOR CLINICAL FETAL GROWTH IN SECOND TRIMESTER, SINGLE OR UNSPECIFIED FETUS: Primary | ICD-10-CM

## 2023-09-13 PROCEDURE — 59025 FETAL NON-STRESS TEST: CPT

## 2023-09-13 PROCEDURE — 76815 OB US LIMITED FETUS(S): CPT | Mod: 26 | Performed by: OBSTETRICS & GYNECOLOGY

## 2023-09-13 PROCEDURE — 76820 UMBILICAL ARTERY ECHO: CPT

## 2023-09-13 PROCEDURE — 59025 FETAL NON-STRESS TEST: CPT | Mod: 26 | Performed by: OBSTETRICS & GYNECOLOGY

## 2023-09-13 PROCEDURE — 76820 UMBILICAL ARTERY ECHO: CPT | Mod: 26 | Performed by: OBSTETRICS & GYNECOLOGY

## 2023-09-13 NOTE — NURSING NOTE
Patient reports positive fetal movement, denies pain, denies contractions, leaking of fluid, or bleeding.  NST Performed due to FGR.  Dr. Rolle reviewed efm tracing. See NST/BPP Doc Flowsheet tab. SBAR given to BREONNA TIAN, see their note in Epic.

## 2023-09-13 NOTE — PROGRESS NOTES
"Please see \"Imaging\" tab under \"Chart Review\" for details of today's visit.    Kevin Rolle    "

## 2023-09-15 ENCOUNTER — HOSPITAL ENCOUNTER (OUTPATIENT)
Dept: ULTRASOUND IMAGING | Facility: CLINIC | Age: 23
Discharge: HOME | End: 2023-09-15
Attending: STUDENT IN AN ORGANIZED HEALTH CARE EDUCATION/TRAINING PROGRAM
Payer: COMMERCIAL

## 2023-09-15 ENCOUNTER — TRANSFERRED RECORDS (OUTPATIENT)
Dept: HEALTH INFORMATION MANAGEMENT | Facility: CLINIC | Age: 23
End: 2023-09-15

## 2023-09-15 ENCOUNTER — OFFICE VISIT (OUTPATIENT)
Dept: MATERNAL FETAL MEDICINE | Facility: CLINIC | Age: 23
End: 2023-09-15
Attending: STUDENT IN AN ORGANIZED HEALTH CARE EDUCATION/TRAINING PROGRAM
Payer: COMMERCIAL

## 2023-09-15 DIAGNOSIS — O36.5920 POOR CLINICAL FETAL GROWTH IN SECOND TRIMESTER, SINGLE OR UNSPECIFIED FETUS: ICD-10-CM

## 2023-09-15 PROCEDURE — 76820 UMBILICAL ARTERY ECHO: CPT

## 2023-09-15 PROCEDURE — 59025 FETAL NON-STRESS TEST: CPT | Mod: 26 | Performed by: OBSTETRICS & GYNECOLOGY

## 2023-09-15 PROCEDURE — 76815 OB US LIMITED FETUS(S): CPT | Mod: 26 | Performed by: OBSTETRICS & GYNECOLOGY

## 2023-09-15 PROCEDURE — 76820 UMBILICAL ARTERY ECHO: CPT | Mod: 26 | Performed by: OBSTETRICS & GYNECOLOGY

## 2023-09-15 PROCEDURE — 59025 FETAL NON-STRESS TEST: CPT

## 2023-09-15 NOTE — NURSING NOTE
Patient reports positive fetal movement, denies pain, denies contractions, leaking of fluid, or bleeding. NST Performed due to FGR.   reviewed efm tracing. See NST/BPP Doc Flowsheet tab.

## 2023-09-19 ENCOUNTER — HOSPITAL ENCOUNTER (OUTPATIENT)
Dept: ULTRASOUND IMAGING | Facility: CLINIC | Age: 23
Discharge: HOME | End: 2023-09-19
Attending: STUDENT IN AN ORGANIZED HEALTH CARE EDUCATION/TRAINING PROGRAM
Payer: COMMERCIAL

## 2023-09-19 ENCOUNTER — OFFICE VISIT (OUTPATIENT)
Dept: MATERNAL FETAL MEDICINE | Facility: CLINIC | Age: 23
End: 2023-09-19
Attending: STUDENT IN AN ORGANIZED HEALTH CARE EDUCATION/TRAINING PROGRAM
Payer: COMMERCIAL

## 2023-09-19 DIAGNOSIS — O36.5920 POOR CLINICAL FETAL GROWTH IN SECOND TRIMESTER, SINGLE OR UNSPECIFIED FETUS: Primary | ICD-10-CM

## 2023-09-19 DIAGNOSIS — O36.5920 POOR CLINICAL FETAL GROWTH IN SECOND TRIMESTER, SINGLE OR UNSPECIFIED FETUS: ICD-10-CM

## 2023-09-19 DIAGNOSIS — O28.3 ABNORMAL PRENATAL ULTRASOUND: ICD-10-CM

## 2023-09-19 PROCEDURE — 59025 FETAL NON-STRESS TEST: CPT

## 2023-09-19 PROCEDURE — 76815 OB US LIMITED FETUS(S): CPT | Mod: 26 | Performed by: OBSTETRICS & GYNECOLOGY

## 2023-09-19 PROCEDURE — 76820 UMBILICAL ARTERY ECHO: CPT

## 2023-09-19 PROCEDURE — 59025 FETAL NON-STRESS TEST: CPT | Mod: 26 | Performed by: OBSTETRICS & GYNECOLOGY

## 2023-09-19 PROCEDURE — 76820 UMBILICAL ARTERY ECHO: CPT | Mod: 26 | Performed by: OBSTETRICS & GYNECOLOGY

## 2023-09-22 ENCOUNTER — OFFICE VISIT (OUTPATIENT)
Dept: MATERNAL FETAL MEDICINE | Facility: CLINIC | Age: 23
End: 2023-09-22
Attending: OBSTETRICS & GYNECOLOGY
Payer: COMMERCIAL

## 2023-09-22 ENCOUNTER — HOSPITAL ENCOUNTER (OUTPATIENT)
Dept: ULTRASOUND IMAGING | Facility: CLINIC | Age: 23
Discharge: HOME | End: 2023-09-22
Attending: OBSTETRICS & GYNECOLOGY
Payer: COMMERCIAL

## 2023-09-22 DIAGNOSIS — O36.5920 POOR CLINICAL FETAL GROWTH IN SECOND TRIMESTER, SINGLE OR UNSPECIFIED FETUS: ICD-10-CM

## 2023-09-22 PROCEDURE — 76820 UMBILICAL ARTERY ECHO: CPT | Mod: 26 | Performed by: OBSTETRICS & GYNECOLOGY

## 2023-09-22 PROCEDURE — 59025 FETAL NON-STRESS TEST: CPT | Mod: 26 | Performed by: OBSTETRICS & GYNECOLOGY

## 2023-09-22 PROCEDURE — 76820 UMBILICAL ARTERY ECHO: CPT

## 2023-09-22 PROCEDURE — 76816 OB US FOLLOW-UP PER FETUS: CPT | Mod: 26 | Performed by: OBSTETRICS & GYNECOLOGY

## 2023-09-22 PROCEDURE — 59025 FETAL NON-STRESS TEST: CPT

## 2023-09-22 NOTE — PROGRESS NOTES
"Please see \"Imaging\" tab under \"Chart Review\" for details of today's visit.    Evelyne Laguerre MD PhD  Maternal Fetal Medicine     "

## 2023-09-29 ENCOUNTER — HOSPITAL ENCOUNTER (OUTPATIENT)
Dept: ULTRASOUND IMAGING | Facility: CLINIC | Age: 23
Discharge: HOME | End: 2023-09-29
Attending: OBSTETRICS & GYNECOLOGY
Payer: COMMERCIAL

## 2023-09-29 ENCOUNTER — OFFICE VISIT (OUTPATIENT)
Dept: MATERNAL FETAL MEDICINE | Facility: CLINIC | Age: 23
End: 2023-09-29
Attending: OBSTETRICS & GYNECOLOGY
Payer: COMMERCIAL

## 2023-09-29 DIAGNOSIS — O36.5920 POOR CLINICAL FETAL GROWTH IN SECOND TRIMESTER, SINGLE OR UNSPECIFIED FETUS: ICD-10-CM

## 2023-09-29 PROCEDURE — 76820 UMBILICAL ARTERY ECHO: CPT | Mod: 26 | Performed by: OBSTETRICS & GYNECOLOGY

## 2023-09-29 PROCEDURE — 76815 OB US LIMITED FETUS(S): CPT | Mod: 26 | Performed by: OBSTETRICS & GYNECOLOGY

## 2023-09-29 PROCEDURE — 76820 UMBILICAL ARTERY ECHO: CPT

## 2023-09-29 PROCEDURE — 59025 FETAL NON-STRESS TEST: CPT | Mod: 26 | Performed by: OBSTETRICS & GYNECOLOGY

## 2023-09-29 NOTE — NURSING NOTE
Patient reports positive fetal movement, no pain, no contractions, leaking of fluid, or bleeding.   Patient denies headache, visual changes, nausea/vomiting, epigastric pain related to preeclampsia.  Education provided to patient on NST/UAR.  SBAR given to MFLULU TIAN, see their note in Epic.   NST Performed due to FGR.   reviewed efm tracing. See NST/BPP Doc Flowsheet tab.   Oriana Carlton RN

## 2023-09-29 NOTE — PROGRESS NOTES
"Please see \"Imaging\" tab under \"Chart Review\" for details of today's US at the [facility].    Raymundo Robert MD  Maternal-Fetal Medicine    "

## 2023-10-06 ENCOUNTER — HOSPITAL ENCOUNTER (OUTPATIENT)
Dept: ULTRASOUND IMAGING | Facility: CLINIC | Age: 23
Discharge: HOME | End: 2023-10-06
Attending: OBSTETRICS & GYNECOLOGY
Payer: COMMERCIAL

## 2023-10-06 ENCOUNTER — OFFICE VISIT (OUTPATIENT)
Dept: MATERNAL FETAL MEDICINE | Facility: CLINIC | Age: 23
End: 2023-10-06
Attending: OBSTETRICS & GYNECOLOGY
Payer: COMMERCIAL

## 2023-10-06 DIAGNOSIS — O36.5920 POOR CLINICAL FETAL GROWTH IN SECOND TRIMESTER, SINGLE OR UNSPECIFIED FETUS: Primary | ICD-10-CM

## 2023-10-06 DIAGNOSIS — O36.5920 POOR CLINICAL FETAL GROWTH IN SECOND TRIMESTER, SINGLE OR UNSPECIFIED FETUS: ICD-10-CM

## 2023-10-06 DIAGNOSIS — O36.5990 FETAL GROWTH RESTRICTION ANTEPARTUM: ICD-10-CM

## 2023-10-06 PROCEDURE — 76820 UMBILICAL ARTERY ECHO: CPT | Mod: 26 | Performed by: OBSTETRICS & GYNECOLOGY

## 2023-10-06 PROCEDURE — 59025 FETAL NON-STRESS TEST: CPT

## 2023-10-06 PROCEDURE — 59025 FETAL NON-STRESS TEST: CPT | Mod: 26 | Performed by: OBSTETRICS & GYNECOLOGY

## 2023-10-06 PROCEDURE — 76820 UMBILICAL ARTERY ECHO: CPT

## 2023-10-06 PROCEDURE — 76815 OB US LIMITED FETUS(S): CPT | Mod: 26 | Performed by: OBSTETRICS & GYNECOLOGY

## 2023-10-06 NOTE — NURSING NOTE
NST Performed due to fetal growth restriction.  Dr. Robert reviewed efm tracing. See NST/BPP Doc Flowsheet tab. Patient states active fetal movement. Denies questions or concerns today. Patient requesting to switch prenatal care to this location to make it easier to travel to appointments. Patient given the numbers to the [facility] and [facility], suggested that she keep her current OB appointment until she can get in with another provider. Discussed that she may not see the same OB for delivery as she would for appointments based on their call schedules.  Patient verbalized understanding.

## 2023-10-06 NOTE — PROGRESS NOTES
"Please see \"Imaging\" tab under \"Chart Review\" for details of today's US at the [facility].    Raymundo Robert MD  Maternal-Fetal Medicine    "

## 2023-10-09 ENCOUNTER — HOSPITAL ENCOUNTER (OUTPATIENT)
Dept: ULTRASOUND IMAGING | Facility: CLINIC | Age: 23
Discharge: HOME | End: 2023-10-09
Attending: OBSTETRICS & GYNECOLOGY
Payer: COMMERCIAL

## 2023-10-09 ENCOUNTER — OFFICE VISIT (OUTPATIENT)
Dept: MATERNAL FETAL MEDICINE | Facility: CLINIC | Age: 23
End: 2023-10-09
Attending: OBSTETRICS & GYNECOLOGY
Payer: COMMERCIAL

## 2023-10-09 VITALS — DIASTOLIC BLOOD PRESSURE: 69 MMHG | SYSTOLIC BLOOD PRESSURE: 112 MMHG | HEART RATE: 75 BPM

## 2023-10-09 DIAGNOSIS — O36.5990 FETAL GROWTH RESTRICTION ANTEPARTUM: ICD-10-CM

## 2023-10-09 DIAGNOSIS — O36.5920 POOR CLINICAL FETAL GROWTH IN SECOND TRIMESTER, SINGLE OR UNSPECIFIED FETUS: ICD-10-CM

## 2023-10-09 PROCEDURE — 59025 FETAL NON-STRESS TEST: CPT

## 2023-10-09 PROCEDURE — 59025 FETAL NON-STRESS TEST: CPT | Mod: 26 | Performed by: OBSTETRICS & GYNECOLOGY

## 2023-10-09 PROCEDURE — 76815 OB US LIMITED FETUS(S): CPT | Mod: 26 | Performed by: OBSTETRICS & GYNECOLOGY

## 2023-10-09 PROCEDURE — 76820 UMBILICAL ARTERY ECHO: CPT

## 2023-10-09 PROCEDURE — 76820 UMBILICAL ARTERY ECHO: CPT | Mod: 26 | Performed by: OBSTETRICS & GYNECOLOGY

## 2023-10-09 NOTE — NURSING NOTE
Patient presents to [facility] for NST/Barrios/UAR at 31w3d due to FGR, absent UAR's. Positive fetal movement. Denies LOF, vaginal bleeding or cramping/contractions. NST performed due to FGR, see flowsheet. Reviewed with Dr Briseno. SBAR given to [facility] MD, see their note in Epic.

## 2023-10-09 NOTE — PROGRESS NOTES
Please see full imaging report from ViewPoint program under imaging tab.    Porsche Briseno MD  Maternal Fetal Medicine

## 2023-10-11 ENCOUNTER — HOSPITAL ENCOUNTER (OUTPATIENT)
Dept: ULTRASOUND IMAGING | Facility: CLINIC | Age: 23
Discharge: HOME | End: 2023-10-11
Attending: OBSTETRICS & GYNECOLOGY
Payer: COMMERCIAL

## 2023-10-11 ENCOUNTER — OFFICE VISIT (OUTPATIENT)
Dept: MATERNAL FETAL MEDICINE | Facility: CLINIC | Age: 23
End: 2023-10-11
Attending: OBSTETRICS & GYNECOLOGY
Payer: COMMERCIAL

## 2023-10-11 DIAGNOSIS — O36.5920 POOR CLINICAL FETAL GROWTH IN SECOND TRIMESTER, SINGLE OR UNSPECIFIED FETUS: ICD-10-CM

## 2023-10-11 DIAGNOSIS — O36.5990 FETAL GROWTH RESTRICTION ANTEPARTUM: ICD-10-CM

## 2023-10-11 PROCEDURE — 76820 UMBILICAL ARTERY ECHO: CPT

## 2023-10-11 PROCEDURE — 59025 FETAL NON-STRESS TEST: CPT | Mod: 26 | Performed by: OBSTETRICS & GYNECOLOGY

## 2023-10-11 PROCEDURE — 76820 UMBILICAL ARTERY ECHO: CPT | Mod: 26 | Performed by: OBSTETRICS & GYNECOLOGY

## 2023-10-11 PROCEDURE — 76815 OB US LIMITED FETUS(S): CPT | Mod: 26 | Performed by: OBSTETRICS & GYNECOLOGY

## 2023-10-11 PROCEDURE — 59025 FETAL NON-STRESS TEST: CPT

## 2023-10-11 NOTE — NURSING NOTE
Patient reports positive fetal movement, denies pain, denies contractions, leaking of fluid, or bleeding. NST Performed due to FGR.   reviewed efm tracing. See NST/BPP Doc Flowsheet tab. SBAR given to BREONNA TIAN, see their note in Epic.

## 2023-10-13 ENCOUNTER — OFFICE VISIT (OUTPATIENT)
Dept: MATERNAL FETAL MEDICINE | Facility: CLINIC | Age: 23
End: 2023-10-13
Attending: OBSTETRICS & GYNECOLOGY
Payer: COMMERCIAL

## 2023-10-13 ENCOUNTER — HOSPITAL ENCOUNTER (INPATIENT)
Facility: CLINIC | Age: 23
LOS: 5 days | Discharge: HOME | End: 2023-10-18
Attending: OBSTETRICS & GYNECOLOGY | Admitting: OBSTETRICS & GYNECOLOGY
Payer: COMMERCIAL

## 2023-10-13 ENCOUNTER — HOSPITAL ENCOUNTER (OUTPATIENT)
Dept: ULTRASOUND IMAGING | Facility: CLINIC | Age: 23
Discharge: HOME | End: 2023-10-13
Attending: OBSTETRICS & GYNECOLOGY
Payer: COMMERCIAL

## 2023-10-13 VITALS — RESPIRATION RATE: 16 BRPM | SYSTOLIC BLOOD PRESSURE: 110 MMHG | HEART RATE: 72 BPM | DIASTOLIC BLOOD PRESSURE: 74 MMHG

## 2023-10-13 DIAGNOSIS — Z36.89 ENCOUNTER FOR TRIAGE IN PREGNANT PATIENT: ICD-10-CM

## 2023-10-13 DIAGNOSIS — O36.5920 POOR CLINICAL FETAL GROWTH IN SECOND TRIMESTER, SINGLE OR UNSPECIFIED FETUS: ICD-10-CM

## 2023-10-13 DIAGNOSIS — H61.22 IMPACTED CERUMEN OF LEFT EAR: ICD-10-CM

## 2023-10-13 LAB
ABO/RH(D): NORMAL
ANTIBODY SCREEN: NEGATIVE
BASO+EOS+MONOS # BLD AUTO: NORMAL 10*3/UL
BASO+EOS+MONOS NFR BLD AUTO: NORMAL %
BASOPHILS # BLD AUTO: 0 10E3/UL (ref 0–0.2)
BASOPHILS NFR BLD AUTO: 0 %
EOSINOPHIL # BLD AUTO: 0.1 10E3/UL (ref 0–0.7)
EOSINOPHIL NFR BLD AUTO: 1 %
ERYTHROCYTE [DISTWIDTH] IN BLOOD BY AUTOMATED COUNT: 12.6 % (ref 10–15)
HCT VFR BLD AUTO: 35.7 % (ref 35–47)
HGB BLD-MCNC: 12 G/DL (ref 11.7–15.7)
IMM GRANULOCYTES # BLD: 0 10E3/UL
IMM GRANULOCYTES NFR BLD: 0 %
LYMPHOCYTES # BLD AUTO: 2.6 10E3/UL (ref 0.8–5.3)
LYMPHOCYTES NFR BLD AUTO: 25 %
MCH RBC QN AUTO: 29.9 PG (ref 26.5–33)
MCHC RBC AUTO-ENTMCNC: 33.6 G/DL (ref 31.5–36.5)
MCV RBC AUTO: 89 FL (ref 78–100)
MONOCYTES # BLD AUTO: 0.7 10E3/UL (ref 0–1.3)
MONOCYTES NFR BLD AUTO: 7 %
NEUTROPHILS # BLD AUTO: 6.9 10E3/UL (ref 1.6–8.3)
NEUTROPHILS NFR BLD AUTO: 67 %
NRBC # BLD AUTO: 0 10E3/UL
NRBC BLD AUTO-RTO: 0 /100
PLATELET # BLD AUTO: 355 10E3/UL (ref 150–450)
RBC # BLD AUTO: 4.01 10E6/UL (ref 3.8–5.2)
SPECIMEN EXPIRATION DATE: NORMAL
WBC # BLD AUTO: 10.3 10E3/UL (ref 4–11)

## 2023-10-13 PROCEDURE — 120N000012 HC R&B POSTPARTUM

## 2023-10-13 PROCEDURE — 36415 COLL VENOUS BLD VENIPUNCTURE: CPT | Performed by: OBSTETRICS & GYNECOLOGY

## 2023-10-13 PROCEDURE — 76820 UMBILICAL ARTERY ECHO: CPT | Mod: 26 | Performed by: OBSTETRICS & GYNECOLOGY

## 2023-10-13 PROCEDURE — 85025 COMPLETE CBC W/AUTO DIFF WBC: CPT | Performed by: OBSTETRICS & GYNECOLOGY

## 2023-10-13 PROCEDURE — 86850 RBC ANTIBODY SCREEN: CPT | Performed by: OBSTETRICS & GYNECOLOGY

## 2023-10-13 PROCEDURE — 76816 OB US FOLLOW-UP PER FETUS: CPT | Mod: 26 | Performed by: OBSTETRICS & GYNECOLOGY

## 2023-10-13 PROCEDURE — 86901 BLOOD TYPING SEROLOGIC RH(D): CPT | Performed by: OBSTETRICS & GYNECOLOGY

## 2023-10-13 PROCEDURE — 59025 FETAL NON-STRESS TEST: CPT

## 2023-10-13 PROCEDURE — 76816 OB US FOLLOW-UP PER FETUS: CPT

## 2023-10-13 PROCEDURE — 86780 TREPONEMA PALLIDUM: CPT | Performed by: OBSTETRICS & GYNECOLOGY

## 2023-10-13 PROCEDURE — 250N000011 HC RX IP 250 OP 636: Performed by: OBSTETRICS & GYNECOLOGY

## 2023-10-13 RX ORDER — ACETAMINOPHEN 325 MG/1
650 TABLET ORAL EVERY 4 HOURS PRN
Status: DISCONTINUED | OUTPATIENT
Start: 2023-10-13 | End: 2023-10-15 | Stop reason: HOSPADM

## 2023-10-13 RX ORDER — HYDROXYZINE HYDROCHLORIDE 25 MG/1
100 TABLET, FILM COATED ORAL
Status: DISCONTINUED | OUTPATIENT
Start: 2023-10-13 | End: 2023-10-15 | Stop reason: HOSPADM

## 2023-10-13 RX ORDER — DIPHENHYDRAMINE HCL 25 MG
25 CAPSULE ORAL EVERY 6 HOURS PRN
Status: DISCONTINUED | OUTPATIENT
Start: 2023-10-13 | End: 2023-10-15 | Stop reason: HOSPADM

## 2023-10-13 RX ORDER — BETAMETHASONE SODIUM PHOSPHATE AND BETAMETHASONE ACETATE 3; 3 MG/ML; MG/ML
12 INJECTION, SUSPENSION INTRA-ARTICULAR; INTRALESIONAL; INTRAMUSCULAR; SOFT TISSUE EVERY 24 HOURS
Status: COMPLETED | OUTPATIENT
Start: 2023-10-13 | End: 2023-10-14

## 2023-10-13 RX ORDER — ONDANSETRON 4 MG/1
4 TABLET, ORALLY DISINTEGRATING ORAL EVERY 6 HOURS PRN
Status: DISCONTINUED | OUTPATIENT
Start: 2023-10-13 | End: 2023-10-15 | Stop reason: HOSPADM

## 2023-10-13 RX ORDER — PROCHLORPERAZINE 25 MG
25 SUPPOSITORY, RECTAL RECTAL EVERY 12 HOURS PRN
Status: DISCONTINUED | OUTPATIENT
Start: 2023-10-13 | End: 2023-10-15 | Stop reason: HOSPADM

## 2023-10-13 RX ORDER — PRENATAL VIT/IRON FUM/FOLIC AC 27MG-0.8MG
1 TABLET ORAL DAILY
Status: DISCONTINUED | OUTPATIENT
Start: 2023-10-13 | End: 2023-10-15 | Stop reason: HOSPADM

## 2023-10-13 RX ORDER — DIPHENHYDRAMINE HYDROCHLORIDE 50 MG/ML
25 INJECTION INTRAMUSCULAR; INTRAVENOUS EVERY 6 HOURS PRN
Status: DISCONTINUED | OUTPATIENT
Start: 2023-10-13 | End: 2023-10-15 | Stop reason: HOSPADM

## 2023-10-13 RX ORDER — METOCLOPRAMIDE HYDROCHLORIDE 5 MG/ML
10 INJECTION INTRAMUSCULAR; INTRAVENOUS EVERY 6 HOURS PRN
Status: DISCONTINUED | OUTPATIENT
Start: 2023-10-13 | End: 2023-10-15 | Stop reason: HOSPADM

## 2023-10-13 RX ORDER — ONDANSETRON 2 MG/ML
4 INJECTION INTRAMUSCULAR; INTRAVENOUS EVERY 6 HOURS PRN
Status: DISCONTINUED | OUTPATIENT
Start: 2023-10-13 | End: 2023-10-15 | Stop reason: HOSPADM

## 2023-10-13 RX ORDER — ZOLPIDEM TARTRATE 5 MG/1
5 TABLET ORAL
Status: DISCONTINUED | OUTPATIENT
Start: 2023-10-13 | End: 2023-10-15 | Stop reason: HOSPADM

## 2023-10-13 RX ORDER — MAGNESIUM HYDROXIDE/ALUMINUM HYDROXICE/SIMETHICONE 120; 1200; 1200 MG/30ML; MG/30ML; MG/30ML
30 SUSPENSION ORAL
Status: DISCONTINUED | OUTPATIENT
Start: 2023-10-13 | End: 2023-10-15 | Stop reason: HOSPADM

## 2023-10-13 RX ORDER — PROCHLORPERAZINE MALEATE 5 MG
10 TABLET ORAL EVERY 6 HOURS PRN
Status: DISCONTINUED | OUTPATIENT
Start: 2023-10-13 | End: 2023-10-15 | Stop reason: HOSPADM

## 2023-10-13 RX ORDER — METOCLOPRAMIDE 10 MG/1
10 TABLET ORAL EVERY 6 HOURS PRN
Status: DISCONTINUED | OUTPATIENT
Start: 2023-10-13 | End: 2023-10-15 | Stop reason: HOSPADM

## 2023-10-13 RX ADMIN — BETAMETHASONE SODIUM PHOSPHATE AND BETAMETHASONE ACETATE 12 MG: 3; 3 INJECTION, SUSPENSION INTRA-ARTICULAR; INTRALESIONAL; INTRAMUSCULAR at 21:50

## 2023-10-13 ASSESSMENT — ACTIVITIES OF DAILY LIVING (ADL)
ADLS_ACUITY_SCORE: 35
ADLS_ACUITY_SCORE: 18

## 2023-10-13 NOTE — NURSING NOTE
Patient presents to [facility] for RL2/UAR at 32w0d due to fetal growth restriction, 2 vessel cord, VSD, marginal cord insertion. Positive fetal movement. Denies LOF, vaginal bleeding or cramping/contractions. SBAR given to M MD, see their note in Epic. NST performed.  reviewed efm tracing. See NST/BPP Doc Flowsheet tab.

## 2023-10-14 ENCOUNTER — ANESTHESIA EVENT (OUTPATIENT)
Dept: OBGYN | Facility: CLINIC | Age: 23
End: 2023-10-14
Payer: COMMERCIAL

## 2023-10-14 LAB — T PALLIDUM AB SER QL: NONREACTIVE

## 2023-10-14 PROCEDURE — 120N000012 HC R&B POSTPARTUM

## 2023-10-14 PROCEDURE — 250N000011 HC RX IP 250 OP 636: Performed by: OBSTETRICS & GYNECOLOGY

## 2023-10-14 PROCEDURE — 250N000013 HC RX MED GY IP 250 OP 250 PS 637: Performed by: OBSTETRICS & GYNECOLOGY

## 2023-10-14 PROCEDURE — 99231 SBSQ HOSP IP/OBS SF/LOW 25: CPT | Performed by: NURSE PRACTITIONER

## 2023-10-14 RX ADMIN — PRENATAL VITAMINS-IRON FUMARATE 27 MG IRON-FOLIC ACID 0.8 MG TABLET 1 TABLET: at 09:51

## 2023-10-14 RX ADMIN — BETAMETHASONE SODIUM PHOSPHATE AND BETAMETHASONE ACETATE 12 MG: 3; 3 INJECTION, SUSPENSION INTRA-ARTICULAR; INTRALESIONAL; INTRAMUSCULAR at 21:18

## 2023-10-14 ASSESSMENT — ACTIVITIES OF DAILY LIVING (ADL)
ADLS_ACUITY_SCORE: 18

## 2023-10-14 NOTE — CONSULTS
Neonatology Advanced Practice Antepartum Counseling Consult      I was asked to provide antepartum counseling for Monique Allen at the request of Shahnaz Reynoso MD secondary to anticipated delivery by C/S tomorrow due to FGR and intermittent AEDF  at 32 weeks. Ms. Allen is currently 32 and 1/7 weeks and has a hx significant for FGR, 2 vessel cord,  small VSD, with reassuring NST and  a normal  NIPT + amnio. Betamethasone #1 was administered on 10/13 @ 2100. Ms. Allen, accompanied by her spouse, Fatemeh Cannon was counseled on the expected hospital course, potential risks, and outcomes associated with a growth restricted infant born at approximately 32 weeks gestation. The counseling included: morbidity, mortality, initial delivery room stabilization, respiratory course, lung development, RDS,  hyperbilirubinemia, hemodynamic support, infection, routine HUS for intraventricular hemorrhage/FGR screening, VSD follow up, nutrition, growth and development, and long term outcomes.     The family consented to VIT K and EES administration after delivery and donor milk. We also discussed whether the family would benefit from  services and both Ms. Allen and her  declined  services at this time.     Please feel free to call with any additional questions or concerns.      JUANIS Le, CNP 10/14/2023  3:10 PM      Advanced Practice Service    Intensive Care Unit  [facility]      Floor Time (min): 10  Face to Face Time (min): 20  Total Time (minutes): 30  More than 50% of my time was spent in direct, face to face, antepartum counseling with the above patient.

## 2023-10-14 NOTE — H&P
"[facility] Labor and Delivery History and Physical    Monique Allen MRN# [id number]   Age: 23 year old YOB: 2000     Date of Admission:  10/13/2023    Primary care provider: No Ref-Primary, Physician           Chief Complaint:   Monique Allen is a 23 year old female who is 32w1d pregnant and being admitted for intrauterine growth restriction at <1% with absent end diastolic flow. Was see at  yesterday and recommended to be admitted and betamethasone and delivery. She is also judith breech presentation          Pregnancy history:     OBSTETRIC HISTORY:    OB History    Para Term  AB Living   1 0 0 0 0 0   SAB IAB Ectopic Multiple Live Births   0 0 0 0 0      # Outcome Date GA Lbr Kenroy/2nd Weight Sex Delivery Anes PTL Lv   1 Current                EDC: Estimated Date of Delivery: Dec 8, 2023    Prenatal Labs:   Lab Results   Component Value Date    AS Negative 10/13/2023    HEPBANG Nonreactive 2023    HGB 12.0 10/13/2023       GBS Status:   No results found for: \"GBS\"    Active Problem List  Patient Active Problem List   Diagnosis    Encounter for triage in pregnant patient    Indication for care in labor or delivery       Medication Prior to Admission  Medications Prior to Admission   Medication Sig Dispense Refill Last Dose    Prenatal Vit-Fe Fumarate-FA (PRENATAL MULTIVITAMIN  PLUS IRON) 27-1 MG TABS Take by mouth daily      .        Maternal Past Medical History:   No past medical history on file.                    Family History:   This patient has no significant family history            Social History:     Social History     Tobacco Use    Smoking status: Never     Passive exposure: Never    Smokeless tobacco: Never   Substance Use Topics    Alcohol use: Never            Review of Systems:   C: NEGATIVE for fever, chills, change in weight  E/M: NEGATIVE for ear, mouth and throat problems  R: NEGATIVE for significant cough or SOB  CV: NEGATIVE for " chest pain, palpitations or peripheral edema          Physical Exam:   Vitals were reviewed    Constitutional: Awake, alert, cooperative, no apparent distress, and appears stated age.  Eyes: Lids and lashes normal, pupils equal, round and reactive to light, extra ocular muscles intact, sclera clear, conjunctiva normal.  ENT: Normocephalic, without obvious abnormality, atramatic, sinuses nontender on palpation, external ears without lesions, oral pharynx with moist mucus membranes, tonsils without erythema or exudates, gums normal and good dentition.  Neck: Supple, symmetrical, trachea midline, no adenopathy, thyroid symmetric, not enlarged and no tenderness, skin normal.  Hematologic / Lymphatic: No cervical lymphadenopathy and no supraclavicular lymphadenopathy.  Back: Symmetric, no curvature, spinous processes are non-tender on palpation, paraspinous muscles are non-tender on palpation, no costal vertebral tenderness.  Abdomen: No scars, normal bowel sounds, soft, non-distended, non-tender, no masses palpated, no hepatosplenomegally.  Genitourinary: No urethral discharge, normal external genitalia, no hernia.  Musculoskeletal: No redness, warmth, or swelling of the joints.  Full range of motion noted.  Motor strength is 5 out of 5 all extremities bilaterally.  Tone is normal.  Neuropsychiatric: Normal affect, mood, orientation, memory and insight.  Skin: No rashes, erythema, pallor, petechia or purpura.     Cervix:   Membranes: intact     Presentation:Breech  Fetal Heart Rate Tracing: Tier 1 (normal)  Tocometer: external monitor                       Assessment:   Monique Allen is a 32w1d pregnant female admitted with IUGR and breech requiring delivery.          Plan:   Prepare for  section Bert 10/15/23    Christofer Fernandez MD

## 2023-10-14 NOTE — PROGRESS NOTES
Monique was stable overnight. VS, fetal monitoring WNL. She and Willie are looking forward to seeing the rounding MD to ask questions this AM. Also placed NNP consult for today, they are aware.

## 2023-10-14 NOTE — PLAN OF CARE
Goal Outcome Evaluation: Pt states she slept well. Denies any new symptom. Reports good fetal movement. Pt has questions about her care plan moving forward. Provider will be here soon to clarify.      Plan of Care Reviewed With: patient, spouse    Overall Patient Progress: no changeOverall Patient Progress: no change

## 2023-10-14 NOTE — PROGRESS NOTES
Monique is a  32+0 presenting with spouse for direct admit for antepartum status due to FGR and absent end diastolic flow.   Antepartum orders received from Dr Reynoso.

## 2023-10-14 NOTE — PROGRESS NOTES
Call received from Dr. Johnson regarding plan of care for Ms. Allen following her ultrasound yesterday. Ultrasound imaging and report reviewed, discussed case with Dr. Martinez (who saw her in clinic yesterday).     Plan as recommended is:  -Admit for delivery  -BMZ given 32 weeks   -Continuous monitoring  -Delivery once steroid complete or sooner as clinically indicated    MD NEETA Bonilla  , OB/GYN  Maternal-Fetal Medicine  [phone] (Pager)

## 2023-10-15 ENCOUNTER — ANESTHESIA (OUTPATIENT)
Dept: OBGYN | Facility: CLINIC | Age: 23
End: 2023-10-15
Payer: COMMERCIAL

## 2023-10-15 PROCEDURE — 250N000011 HC RX IP 250 OP 636: Performed by: OBSTETRICS & GYNECOLOGY

## 2023-10-15 PROCEDURE — 272N000001 HC OR GENERAL SUPPLY STERILE: Performed by: OBSTETRICS & GYNECOLOGY

## 2023-10-15 PROCEDURE — 250N000011 HC RX IP 250 OP 636: Performed by: ANESTHESIOLOGY

## 2023-10-15 PROCEDURE — 258N000003 HC RX IP 258 OP 636: Performed by: OBSTETRICS & GYNECOLOGY

## 2023-10-15 PROCEDURE — 999N000040 HC STATISTIC CONSULT NO CHARGE VASC ACCESS

## 2023-10-15 PROCEDURE — 250N000009 HC RX 250: Performed by: NURSE ANESTHETIST, CERTIFIED REGISTERED

## 2023-10-15 PROCEDURE — 88307 TISSUE EXAM BY PATHOLOGIST: CPT | Mod: TC | Performed by: OBSTETRICS & GYNECOLOGY

## 2023-10-15 PROCEDURE — 710N000009 HC RECOVERY PHASE 1, LEVEL 1, PER MIN: Performed by: OBSTETRICS & GYNECOLOGY

## 2023-10-15 PROCEDURE — 250N000013 HC RX MED GY IP 250 OP 250 PS 637: Performed by: OBSTETRICS & GYNECOLOGY

## 2023-10-15 PROCEDURE — 370N000017 HC ANESTHESIA TECHNICAL FEE, PER MIN: Performed by: OBSTETRICS & GYNECOLOGY

## 2023-10-15 PROCEDURE — 88307 TISSUE EXAM BY PATHOLOGIST: CPT | Mod: 26 | Performed by: PATHOLOGY

## 2023-10-15 PROCEDURE — 250N000013 HC RX MED GY IP 250 OP 250 PS 637

## 2023-10-15 PROCEDURE — 250N000009 HC RX 250: Performed by: ANESTHESIOLOGY

## 2023-10-15 PROCEDURE — 360N000076 HC SURGERY LEVEL 3, PER MIN: Performed by: OBSTETRICS & GYNECOLOGY

## 2023-10-15 PROCEDURE — 250N000009 HC RX 250

## 2023-10-15 PROCEDURE — 250N000011 HC RX IP 250 OP 636: Performed by: NURSE ANESTHETIST, CERTIFIED REGISTERED

## 2023-10-15 PROCEDURE — 258N000003 HC RX IP 258 OP 636: Performed by: NURSE ANESTHETIST, CERTIFIED REGISTERED

## 2023-10-15 PROCEDURE — 250N000011 HC RX IP 250 OP 636: Mod: JZ | Performed by: OBSTETRICS & GYNECOLOGY

## 2023-10-15 PROCEDURE — 250N000011 HC RX IP 250 OP 636: Mod: JZ

## 2023-10-15 PROCEDURE — 120N000012 HC R&B POSTPARTUM

## 2023-10-15 RX ORDER — METOCLOPRAMIDE 10 MG/1
10 TABLET ORAL EVERY 6 HOURS PRN
Status: DISCONTINUED | OUTPATIENT
Start: 2023-10-15 | End: 2023-10-18 | Stop reason: HOSPADM

## 2023-10-15 RX ORDER — LIDOCAINE HCL/EPINEPHRINE/PF 2%-1:200K
VIAL (ML) INJECTION
Status: DISCONTINUED | OUTPATIENT
Start: 2023-10-15 | End: 2023-10-15

## 2023-10-15 RX ORDER — MODIFIED LANOLIN
OINTMENT (GRAM) TOPICAL
Status: DISCONTINUED | OUTPATIENT
Start: 2023-10-15 | End: 2023-10-18 | Stop reason: HOSPADM

## 2023-10-15 RX ORDER — ONDANSETRON 2 MG/ML
INJECTION INTRAMUSCULAR; INTRAVENOUS PRN
Status: DISCONTINUED | OUTPATIENT
Start: 2023-10-15 | End: 2023-10-15

## 2023-10-15 RX ORDER — ONDANSETRON 4 MG/1
4 TABLET, ORALLY DISINTEGRATING ORAL EVERY 6 HOURS PRN
Status: DISCONTINUED | OUTPATIENT
Start: 2023-10-15 | End: 2023-10-18 | Stop reason: HOSPADM

## 2023-10-15 RX ORDER — OXYTOCIN/0.9 % SODIUM CHLORIDE 30/500 ML
PLASTIC BAG, INJECTION (ML) INTRAVENOUS CONTINUOUS PRN
Status: DISCONTINUED | OUTPATIENT
Start: 2023-10-15 | End: 2023-10-15

## 2023-10-15 RX ORDER — PROCHLORPERAZINE 25 MG
25 SUPPOSITORY, RECTAL RECTAL EVERY 12 HOURS PRN
Status: DISCONTINUED | OUTPATIENT
Start: 2023-10-15 | End: 2023-10-18 | Stop reason: HOSPADM

## 2023-10-15 RX ORDER — AMOXICILLIN 250 MG
1 CAPSULE ORAL 2 TIMES DAILY
Status: DISCONTINUED | OUTPATIENT
Start: 2023-10-15 | End: 2023-10-18 | Stop reason: HOSPADM

## 2023-10-15 RX ORDER — AMOXICILLIN 250 MG
2 CAPSULE ORAL 2 TIMES DAILY
Status: DISCONTINUED | OUTPATIENT
Start: 2023-10-15 | End: 2023-10-18 | Stop reason: HOSPADM

## 2023-10-15 RX ORDER — SODIUM CHLORIDE, SODIUM LACTATE, POTASSIUM CHLORIDE, CALCIUM CHLORIDE 600; 310; 30; 20 MG/100ML; MG/100ML; MG/100ML; MG/100ML
INJECTION, SOLUTION INTRAVENOUS CONTINUOUS
Status: DISCONTINUED | OUTPATIENT
Start: 2023-10-15 | End: 2023-10-15 | Stop reason: HOSPADM

## 2023-10-15 RX ORDER — METHYLERGONOVINE MALEATE 0.2 MG/ML
200 INJECTION INTRAVENOUS
Status: DISCONTINUED | OUTPATIENT
Start: 2023-10-15 | End: 2023-10-18 | Stop reason: HOSPADM

## 2023-10-15 RX ORDER — METHYLERGONOVINE MALEATE 0.2 MG/ML
200 INJECTION INTRAVENOUS
Status: DISCONTINUED | OUTPATIENT
Start: 2023-10-15 | End: 2023-10-15 | Stop reason: HOSPADM

## 2023-10-15 RX ORDER — SIMETHICONE 80 MG
80 TABLET,CHEWABLE ORAL 4 TIMES DAILY PRN
Status: DISCONTINUED | OUTPATIENT
Start: 2023-10-15 | End: 2023-10-18 | Stop reason: HOSPADM

## 2023-10-15 RX ORDER — OXYTOCIN 10 [USP'U]/ML
10 INJECTION, SOLUTION INTRAMUSCULAR; INTRAVENOUS
Status: DISCONTINUED | OUTPATIENT
Start: 2023-10-15 | End: 2023-10-15

## 2023-10-15 RX ORDER — ONDANSETRON 2 MG/ML
4 INJECTION INTRAMUSCULAR; INTRAVENOUS EVERY 6 HOURS PRN
Status: DISCONTINUED | OUTPATIENT
Start: 2023-10-15 | End: 2023-10-15 | Stop reason: HOSPADM

## 2023-10-15 RX ORDER — OXYCODONE HYDROCHLORIDE 5 MG/1
5 TABLET ORAL EVERY 4 HOURS PRN
Status: DISCONTINUED | OUTPATIENT
Start: 2023-10-15 | End: 2023-10-18 | Stop reason: HOSPADM

## 2023-10-15 RX ORDER — OXYTOCIN 10 [USP'U]/ML
10 INJECTION, SOLUTION INTRAMUSCULAR; INTRAVENOUS
Status: DISCONTINUED | OUTPATIENT
Start: 2023-10-15 | End: 2023-10-18 | Stop reason: HOSPADM

## 2023-10-15 RX ORDER — IBUPROFEN 400 MG/1
800 TABLET, FILM COATED ORAL EVERY 6 HOURS
Status: DISCONTINUED | OUTPATIENT
Start: 2023-10-16 | End: 2023-10-18 | Stop reason: HOSPADM

## 2023-10-15 RX ORDER — METOCLOPRAMIDE HYDROCHLORIDE 5 MG/ML
10 INJECTION INTRAMUSCULAR; INTRAVENOUS EVERY 6 HOURS PRN
Status: DISCONTINUED | OUTPATIENT
Start: 2023-10-15 | End: 2023-10-18 | Stop reason: HOSPADM

## 2023-10-15 RX ORDER — NALOXONE HYDROCHLORIDE 0.4 MG/ML
0.4 INJECTION, SOLUTION INTRAMUSCULAR; INTRAVENOUS; SUBCUTANEOUS
Status: DISCONTINUED | OUTPATIENT
Start: 2023-10-15 | End: 2023-10-18 | Stop reason: HOSPADM

## 2023-10-15 RX ORDER — TRANEXAMIC ACID 10 MG/ML
1 INJECTION, SOLUTION INTRAVENOUS EVERY 30 MIN PRN
Status: DISCONTINUED | OUTPATIENT
Start: 2023-10-15 | End: 2023-10-18 | Stop reason: HOSPADM

## 2023-10-15 RX ORDER — NALOXONE HYDROCHLORIDE 0.4 MG/ML
0.2 INJECTION, SOLUTION INTRAMUSCULAR; INTRAVENOUS; SUBCUTANEOUS
Status: DISCONTINUED | OUTPATIENT
Start: 2023-10-15 | End: 2023-10-18 | Stop reason: HOSPADM

## 2023-10-15 RX ORDER — LIDOCAINE HCL/EPINEPHRINE/PF 2%-1:200K
VIAL (ML) INJECTION PRN
Status: DISCONTINUED | OUTPATIENT
Start: 2023-10-15 | End: 2023-10-15

## 2023-10-15 RX ORDER — CITRIC ACID/SODIUM CITRATE 334-500MG
30 SOLUTION, ORAL ORAL
Status: COMPLETED | OUTPATIENT
Start: 2023-10-15 | End: 2023-10-15

## 2023-10-15 RX ORDER — ACETAMINOPHEN 325 MG/1
975 TABLET ORAL EVERY 6 HOURS
Status: DISCONTINUED | OUTPATIENT
Start: 2023-10-15 | End: 2023-10-18 | Stop reason: HOSPADM

## 2023-10-15 RX ORDER — TRANEXAMIC ACID 10 MG/ML
1 INJECTION, SOLUTION INTRAVENOUS EVERY 30 MIN PRN
Status: DISCONTINUED | OUTPATIENT
Start: 2023-10-15 | End: 2023-10-15 | Stop reason: HOSPADM

## 2023-10-15 RX ORDER — ACETAMINOPHEN 325 MG/1
TABLET ORAL
Status: COMPLETED
Start: 2023-10-15 | End: 2023-10-15

## 2023-10-15 RX ORDER — ONDANSETRON 2 MG/ML
4 INJECTION INTRAMUSCULAR; INTRAVENOUS EVERY 6 HOURS PRN
Status: DISCONTINUED | OUTPATIENT
Start: 2023-10-15 | End: 2023-10-18 | Stop reason: HOSPADM

## 2023-10-15 RX ORDER — CEFAZOLIN SODIUM 2 G/100ML
2 INJECTION, SOLUTION INTRAVENOUS
Status: COMPLETED | OUTPATIENT
Start: 2023-10-15 | End: 2023-10-15

## 2023-10-15 RX ORDER — LABETALOL 20 MG/4 ML (5 MG/ML) INTRAVENOUS SYRINGE
PRN
Status: DISCONTINUED | OUTPATIENT
Start: 2023-10-15 | End: 2023-10-15

## 2023-10-15 RX ORDER — ACETAMINOPHEN 325 MG/1
975 TABLET ORAL ONCE
Status: COMPLETED | OUTPATIENT
Start: 2023-10-15 | End: 2023-10-15

## 2023-10-15 RX ORDER — DIPHENHYDRAMINE HYDROCHLORIDE 50 MG/ML
25 INJECTION INTRAMUSCULAR; INTRAVENOUS EVERY 6 HOURS PRN
Status: DISCONTINUED | OUTPATIENT
Start: 2023-10-15 | End: 2023-10-18 | Stop reason: HOSPADM

## 2023-10-15 RX ORDER — OXYTOCIN/0.9 % SODIUM CHLORIDE 30/500 ML
PLASTIC BAG, INJECTION (ML) INTRAVENOUS
Status: COMPLETED
Start: 2023-10-15 | End: 2023-10-15

## 2023-10-15 RX ORDER — FENTANYL CITRATE-0.9 % NACL/PF 10 MCG/ML
100 PLASTIC BAG, INJECTION (ML) INTRAVENOUS EVERY 5 MIN PRN
Status: DISCONTINUED | OUTPATIENT
Start: 2023-10-15 | End: 2023-10-15 | Stop reason: HOSPADM

## 2023-10-15 RX ORDER — OXYTOCIN/0.9 % SODIUM CHLORIDE 30/500 ML
340 PLASTIC BAG, INJECTION (ML) INTRAVENOUS CONTINUOUS PRN
Status: DISCONTINUED | OUTPATIENT
Start: 2023-10-15 | End: 2023-10-15 | Stop reason: HOSPADM

## 2023-10-15 RX ORDER — HYDROCORTISONE 25 MG/G
CREAM TOPICAL 3 TIMES DAILY PRN
Status: DISCONTINUED | OUTPATIENT
Start: 2023-10-15 | End: 2023-10-18 | Stop reason: HOSPADM

## 2023-10-15 RX ORDER — MISOPROSTOL 200 UG/1
400 TABLET ORAL
Status: DISCONTINUED | OUTPATIENT
Start: 2023-10-15 | End: 2023-10-18 | Stop reason: HOSPADM

## 2023-10-15 RX ORDER — MISOPROSTOL 200 UG/1
400 TABLET ORAL
Status: DISCONTINUED | OUTPATIENT
Start: 2023-10-15 | End: 2023-10-15 | Stop reason: HOSPADM

## 2023-10-15 RX ORDER — OXYTOCIN/0.9 % SODIUM CHLORIDE 30/500 ML
100-340 PLASTIC BAG, INJECTION (ML) INTRAVENOUS CONTINUOUS PRN
Status: DISCONTINUED | OUTPATIENT
Start: 2023-10-15 | End: 2023-10-15

## 2023-10-15 RX ORDER — BISACODYL 10 MG
10 SUPPOSITORY, RECTAL RECTAL DAILY PRN
Status: DISCONTINUED | OUTPATIENT
Start: 2023-10-17 | End: 2023-10-18 | Stop reason: HOSPADM

## 2023-10-15 RX ORDER — DIPHENHYDRAMINE HCL 25 MG
25 CAPSULE ORAL EVERY 6 HOURS PRN
Status: DISCONTINUED | OUTPATIENT
Start: 2023-10-15 | End: 2023-10-18 | Stop reason: HOSPADM

## 2023-10-15 RX ORDER — ONDANSETRON 4 MG/1
4 TABLET, ORALLY DISINTEGRATING ORAL EVERY 6 HOURS PRN
Status: DISCONTINUED | OUTPATIENT
Start: 2023-10-15 | End: 2023-10-15 | Stop reason: HOSPADM

## 2023-10-15 RX ORDER — KETOROLAC TROMETHAMINE 30 MG/ML
30 INJECTION, SOLUTION INTRAMUSCULAR; INTRAVENOUS EVERY 6 HOURS
Status: COMPLETED | OUTPATIENT
Start: 2023-10-15 | End: 2023-10-16

## 2023-10-15 RX ORDER — CARBOPROST TROMETHAMINE 250 UG/ML
250 INJECTION, SOLUTION INTRAMUSCULAR
Status: DISCONTINUED | OUTPATIENT
Start: 2023-10-15 | End: 2023-10-18 | Stop reason: HOSPADM

## 2023-10-15 RX ORDER — OXYTOCIN/0.9 % SODIUM CHLORIDE 30/500 ML
340 PLASTIC BAG, INJECTION (ML) INTRAVENOUS CONTINUOUS PRN
Status: DISCONTINUED | OUTPATIENT
Start: 2023-10-15 | End: 2023-10-18 | Stop reason: HOSPADM

## 2023-10-15 RX ORDER — CEFAZOLIN SODIUM 2 G/100ML
2 INJECTION, SOLUTION INTRAVENOUS SEE ADMIN INSTRUCTIONS
Status: DISCONTINUED | OUTPATIENT
Start: 2023-10-15 | End: 2023-10-15 | Stop reason: HOSPADM

## 2023-10-15 RX ORDER — CARBOPROST TROMETHAMINE 250 UG/ML
250 INJECTION, SOLUTION INTRAMUSCULAR
Status: DISCONTINUED | OUTPATIENT
Start: 2023-10-15 | End: 2023-10-15 | Stop reason: HOSPADM

## 2023-10-15 RX ORDER — OXYTOCIN 10 [USP'U]/ML
10 INJECTION, SOLUTION INTRAMUSCULAR; INTRAVENOUS
Status: DISCONTINUED | OUTPATIENT
Start: 2023-10-15 | End: 2023-10-15 | Stop reason: HOSPADM

## 2023-10-15 RX ORDER — LIDOCAINE 40 MG/G
CREAM TOPICAL
Status: DISCONTINUED | OUTPATIENT
Start: 2023-10-15 | End: 2023-10-15 | Stop reason: HOSPADM

## 2023-10-15 RX ORDER — PROCHLORPERAZINE MALEATE 10 MG
10 TABLET ORAL EVERY 6 HOURS PRN
Status: DISCONTINUED | OUTPATIENT
Start: 2023-10-15 | End: 2023-10-18 | Stop reason: HOSPADM

## 2023-10-15 RX ORDER — KETOROLAC TROMETHAMINE 30 MG/ML
INJECTION, SOLUTION INTRAMUSCULAR; INTRAVENOUS
Status: COMPLETED
Start: 2023-10-15 | End: 2023-10-15

## 2023-10-15 RX ORDER — OXYTOCIN/0.9 % SODIUM CHLORIDE 30/500 ML
100 PLASTIC BAG, INJECTION (ML) INTRAVENOUS CONTINUOUS
Status: DISCONTINUED | OUTPATIENT
Start: 2023-10-15 | End: 2023-10-18 | Stop reason: HOSPADM

## 2023-10-15 RX ORDER — DEXTROSE, SODIUM CHLORIDE, SODIUM LACTATE, POTASSIUM CHLORIDE, AND CALCIUM CHLORIDE 5; .6; .31; .03; .02 G/100ML; G/100ML; G/100ML; G/100ML; G/100ML
INJECTION, SOLUTION INTRAVENOUS CONTINUOUS
Status: DISCONTINUED | OUTPATIENT
Start: 2023-10-15 | End: 2023-10-18 | Stop reason: HOSPADM

## 2023-10-15 RX ORDER — MISOPROSTOL 200 UG/1
800 TABLET ORAL
Status: DISCONTINUED | OUTPATIENT
Start: 2023-10-15 | End: 2023-10-15 | Stop reason: HOSPADM

## 2023-10-15 RX ORDER — LIDOCAINE 40 MG/G
CREAM TOPICAL
Status: DISCONTINUED | OUTPATIENT
Start: 2023-10-15 | End: 2023-10-18 | Stop reason: HOSPADM

## 2023-10-15 RX ORDER — MISOPROSTOL 200 UG/1
800 TABLET ORAL
Status: DISCONTINUED | OUTPATIENT
Start: 2023-10-15 | End: 2023-10-18 | Stop reason: HOSPADM

## 2023-10-15 RX ADMIN — KETOROLAC TROMETHAMINE 30 MG: 30 INJECTION, SOLUTION INTRAMUSCULAR; INTRAVENOUS at 18:35

## 2023-10-15 RX ADMIN — SODIUM CHLORIDE, POTASSIUM CHLORIDE, SODIUM LACTATE AND CALCIUM CHLORIDE: 600; 310; 30; 20 INJECTION, SOLUTION INTRAVENOUS at 14:56

## 2023-10-15 RX ADMIN — SODIUM CHLORIDE, POTASSIUM CHLORIDE, SODIUM LACTATE AND CALCIUM CHLORIDE: 600; 310; 30; 20 INJECTION, SOLUTION INTRAVENOUS at 13:23

## 2023-10-15 RX ADMIN — Medication 100 ML/HR: at 17:05

## 2023-10-15 RX ADMIN — ACETAMINOPHEN 975 MG: 325 TABLET, FILM COATED ORAL at 12:13

## 2023-10-15 RX ADMIN — SODIUM CHLORIDE, POTASSIUM CHLORIDE, SODIUM LACTATE AND CALCIUM CHLORIDE 500 ML: 600; 310; 30; 20 INJECTION, SOLUTION INTRAVENOUS at 12:29

## 2023-10-15 RX ADMIN — LIDOCAINE HYDROCHLORIDE,EPINEPHRINE BITARTRATE 15 ML: 20; .005 INJECTION, SOLUTION EPIDURAL; INFILTRATION; INTRACAUDAL; PERINEURAL at 14:47

## 2023-10-15 RX ADMIN — SENNOSIDES AND DOCUSATE SODIUM 1 TABLET: 50; 8.6 TABLET ORAL at 20:07

## 2023-10-15 RX ADMIN — CEFAZOLIN SODIUM 2 G: 2 INJECTION, SOLUTION INTRAVENOUS at 14:17

## 2023-10-15 RX ADMIN — SODIUM CITRATE AND CITRIC ACID MONOHYDRATE 30 ML: 500; 334 SOLUTION ORAL at 13:24

## 2023-10-15 RX ADMIN — Medication 340 ML/HR: at 15:13

## 2023-10-15 RX ADMIN — ONDANSETRON 4 MG: 2 INJECTION INTRAMUSCULAR; INTRAVENOUS at 14:20

## 2023-10-15 RX ADMIN — SODIUM CHLORIDE, SODIUM LACTATE, POTASSIUM CHLORIDE, CALCIUM CHLORIDE AND DEXTROSE MONOHYDRATE: 5; 600; 310; 30; 20 INJECTION, SOLUTION INTRAVENOUS at 22:01

## 2023-10-15 RX ADMIN — LABETALOL 20 MG/4 ML (5 MG/ML) INTRAVENOUS SYRINGE 5 MG: at 15:38

## 2023-10-15 RX ADMIN — MORPHINE SULFATE 2 MG: 1 INJECTION, SOLUTION EPIDURAL; INTRATHECAL; INTRAVENOUS at 15:15

## 2023-10-15 RX ADMIN — ACETAMINOPHEN 975 MG: 325 TABLET, FILM COATED ORAL at 18:36

## 2023-10-15 RX ADMIN — PHENYLEPHRINE HYDROCHLORIDE 0.5 MCG/KG/MIN: 10 INJECTION INTRAVENOUS at 14:40

## 2023-10-15 ASSESSMENT — ACTIVITIES OF DAILY LIVING (ADL)
ADLS_ACUITY_SCORE: 18

## 2023-10-15 ASSESSMENT — LIFESTYLE VARIABLES: TOBACCO_USE: 0

## 2023-10-15 NOTE — ANESTHESIA CARE TRANSFER NOTE
Patient: Monique Allen    Procedure: Procedure(s):   section       Diagnosis: Indication for care in labor or delivery [O75.9]  Diagnosis Additional Information: No value filed.    Anesthesia Type:   Epidural     Note:    Oropharynx: oropharynx clear of all foreign objects and spontaneously breathing  Level of Consciousness: awake  Oxygen Supplementation: room air    Independent Airway: airway patency satisfactory and stable  Dentition: dentition unchanged  Vital Signs Stable: post-procedure vital signs reviewed and stable  Report to RN Given: handoff report given  Patient transferred to: PACU  Comments: Transferred to PACU, spontaneous RR, on room air.  Monitors and alarms on and functioning, VSS, patient awake and comfortable.  Report to PACU RN  Handoff Report: Identifed the Patient, Identified the Reponsible Provider, Reviewed the pertinent medical history, Discussed the surgical course, Reviewed Intra-OP anesthesia mangement and issues during anesthesia, Set expectations for post-procedure period and Allowed opportunity for questions and acknowledgement of understanding      Vitals:  Vitals Value Taken Time   BP     Temp     Pulse     Resp     SpO2         Electronically Signed By: JUANIS Oglesby CRNA  October 15, 2023  3:52 PM

## 2023-10-15 NOTE — PLAN OF CARE
Goal Outcome Evaluation:      Plan of Care Reviewed With: patient, spouse    Overall Patient Progress: no changeOverall Patient Progress: no change    Saharsha was stable overnight. FHR predominantly Cat 1. Some variable/ episodic decels noted around 5895-0645, resolved with position change. Fetus is very active.

## 2023-10-15 NOTE — ANESTHESIA POSTPROCEDURE EVALUATION
Patient: Monique Allen    Procedure: Procedure(s):   section       Anesthesia Type:  Epidural    Note:  Disposition: Inpatient   Postop Pain Control: Uneventful            Sign Out: Well controlled pain   PONV: No   Neuro/Psych: Uneventful            Sign Out: Acceptable/Baseline neuro status   Airway/Respiratory: Uneventful            Sign Out: Acceptable/Baseline resp. status   CV/Hemodynamics: Uneventful            Sign Out: Acceptable CV status   Other NRE: NONE   DID A NON-ROUTINE EVENT OCCUR? No           Last vitals:  Vitals:    10/14/23 1523 10/14/23 2115 10/15/23 0900   BP: 123/82 122/80 130/86   Resp:    Temp: 37.7  C (99.9  F) 36.4  C (97.5  F) 36.8  C (98.3  F)       Electronically Signed By: Edgar Church MD  October 15, 2023  3:59 PM

## 2023-10-15 NOTE — ANESTHESIA PREPROCEDURE EVALUATION
"Anesthesia Pre-Procedure Evaluation    Patient: Monique Allen   MRN: [id number] : 2000        Procedure : Procedure(s):   section          No past medical history on file.   No past surgical history on file.   Allergies   Allergen Reactions    Sulfa Antibiotics       Social History     Tobacco Use    Smoking status: Never     Passive exposure: Never    Smokeless tobacco: Never   Substance Use Topics    Alcohol use: Never      Wt Readings from Last 1 Encounters:   No data found for Wt        Anesthesia Evaluation            ROS/MED HX  ENT/Pulmonary:    (-) tobacco use and sleep apnea   Neurologic:       Cardiovascular:       METS/Exercise Tolerance:     Hematologic:       Musculoskeletal:       GI/Hepatic:    (-) GERD   Renal/Genitourinary:       Endo:       Psychiatric/Substance Use:       Infectious Disease:       Malignancy:       Other:            Physical Exam    Airway        Mallampati: II   TM distance: > 3 FB   Neck ROM: full   Mouth opening: > 3 cm    Respiratory Devices and Support         Dental       (+) Minor Abnormalities - some fillings, tiny chips      Cardiovascular   cardiovascular exam normal          Pulmonary   pulmonary exam normal                OUTSIDE LABS:  CBC:   Lab Results   Component Value Date    WBC 10.3 10/13/2023    WBC 7.6 2023    HGB 12.0 10/13/2023    HGB 12.4 2023    HCT 35.7 10/13/2023    HCT 37.8 2023     10/13/2023     2023     BMP: No results found for: \"NA\", \"POTASSIUM\", \"CHLORIDE\", \"CO2\", \"BUN\", \"CR\", \"GLC\"  COAGS: No results found for: \"PTT\", \"INR\", \"FIBR\"  POC: No results found for: \"BGM\", \"HCG\", \"HCGS\"  HEPATIC: No results found for: \"ALBUMIN\", \"PROTTOTAL\", \"ALT\", \"AST\", \"GGT\", \"ALKPHOS\", \"BILITOTAL\", \"BILIDIRECT\", \"YAIR\"  OTHER: No results found for: \"PH\", \"LACT\", \"A1C\", \"NOEL\", \"PHOS\", \"MAG\", \"LIPASE\", \"AMYLASE\", \"TSH\", \"T4\", \"T3\", \"CRP\", \"SED\"    Anesthesia Plan    ASA Status:  2    NPO Status:  NPO " Appropriate    Anesthesia Type: Spinal.              Consents    Anesthesia Plan(s) and associated risks, benefits, and realistic alternatives discussed. Questions answered and patient/representative(s) expressed understanding.     - Discussed:     - Discussed with:  Patient            Postoperative Care    Pain management: Multi-modal analgesia.   PONV prophylaxis: Ondansetron (or other 5HT-3)     Comments:                Edgar Church MD

## 2023-10-15 NOTE — PLAN OF CARE
Pt to OR at 1417, after some delays not related to this patient.  Viable baby boy at 31w1d, born via  section due to IUGR.  Pt doing well, currently in OB PACU using double breast pump and hand expression.  Will soon complete time in PACU and stop in NICU before transfer to post partum.

## 2023-10-15 NOTE — ANESTHESIA PROCEDURE NOTES
"Epidural catheter Procedure Note    Pre-Procedure   Staff -        Anesthesiologist:  Edgar Church MD       Performed By: anesthesiologist       Location: OB       Pre-Anesthestic Checklist: patient identified, IV checked, site marked, risks and benefits discussed, informed consent, monitors and equipment checked, pre-op evaluation, at physician/surgeon's request and post-op pain management  Timeout:       Correct Patient: Yes        Correct Procedure: Yes        Correct Site: Yes        Correct Position: Yes   Procedure Documentation  Procedure: epidural catheter       Diagnosis: Labor Pain       Patient Position: sitting       Patient Prep/Sterile Barriers: sterile gloves, mask, patient draped       Skin prep: Betadine       Local skin infiltrated with 4 mL of 1% lidocaine.        Insertion Site: L3-4. (midline approach).       Technique: LORT saline        VINCENT at 5.5 cm.       Needle Type: Veezeony needle       Needle Gauge: 17.        Needle Length (Inches): 3.5        Catheter: 19 G.          Catheter threaded easily.         3 cm epidural space.         Threaded 8.5 cm at skin.         # of attempts: 1 and  # of redirects:  0    Assessment/Narrative         Paresthesias: No.       Test dose of 3 mL lidocaine 1.5% w/ 1:200,000 epinephrine at 14:45 CDT.         Test dose negative, 3 minutes after injection, for signs of intravascular, subdural, or intrathecal injection.       Insertion/Infusion Method: LORT saline       Aspiration negative for Heme or CSF via Epidural Catheter.     Comments:  Multiple attempts were made to get a spinal, but were unsuccessful.      Patient tolerated procedure well      FOR [facility] ([facility]) ONLY:   Pain Team Contact information: please page the Pain Team Via nothingGrinder. Search \"Pain\". During daytime hours, please page the attending first. At night please page the resident first.      "

## 2023-10-15 NOTE — OP NOTE
Operative Note    SURGEON: Shahnaz Reynoso MD    PREOPERATIVE DIAGNOSIS:     1.Single intrauterine pregnancy at 32w2d   2. Severe IUGR with AEDF  3. Breech presentation    POSTOPERATIVE DIAGNOSIS: Same, delivered    OPERATION PERFORMED: Primary low transverse  section    ANESTHESIA: Spinal    EBL: 595 mL    FLUIDS: see anesthesia record    URINE OUTPUT: see anesthesia record    DRAINS: Amin catheter    SPECIMENS: Cord blood and placenta    COMPLICATIONS: None    FINDINGS: Viable male infant weighing 1150 grams with APGARs of 8, 3, and 9 at 1, 5, 9 minutes, respectively. Normal appearing uterus, tubes, and ovaries.    CONDITION: Both mother and infant stable in the immediate postpartum period.     INDICATION: Monique Allen is a 23 year old  at 32w2d who was admitted due to severe IUGR with AEDF [facility] recommended delivery. She received two doses of ANCS prior to delivery.     PROCEDURE:     Informed consent was obtained for the procedure and the patient was taken to the operating room. Spinal anesthesia was obtained and found to be adequate. Intravenous prophylactic antibiotics were administered. The patient was then placed in a dorsal supine position with a left lateral tilt. The patient was prepped and draped in the normal sterile fashion. A perioperative time out was performed to ensure correct patient and procedure.     A Pfannenstiel skin incision was made and carried down to the underlying fascia with the scalpel. The fascia was then nicked in the midline. This was extended laterally via blunt dissection The rectus muscles were then  in the midline. The peritoneum was then entered bluntly and placed on stretch with excellent visualization of the uterus and bladder. A hand was inserted into the abdomen and the uterus was noted to be clear of adhesions. The bladder blade was inserted to protect the bladder. The lower uterine segment was incised in a transverse fashion with the scalpel. This  was extended bluntly. The bladder blade was then removed and the fetal head was elevated to the hysterotomy in an atraumatic fashion. The baby delivered in the breech position. There was no nuchal cord. The remainder of the body then delivered without incident. The cord was clamped times two and cut and the baby was handed off the awaiting pediatric staff. Cord segment and cord blood were obtained.  The placenta was removed manually.     The uterus was exteriorized and cleared of all clots and debris. Attention was turned to the hysterotomy which was then closed with 0-Vicryl in a running, locked fashion. The posterior cul-de-sac was then inspected and cleaned of clot and debris. The hysterotomy inspected and was noted to be hemostatic. The uterus was returned to the abdomen. Bilateral gutters were cleared of all clot and debris. The hysterotomy was reinspected and noted to remain hemostatic. The space of Retzius was noted to be hemostatic. The fascia was then closed with 0 Vicryl in a running fashion. Subcutaneous tissue was inspected and the bovie was used to ensure excellent hemostasis. The subcutaneous tissue was closed with 3-0 vicryl. The skin was closed with a running stitch of 4-0 Monocryl in a subcuticular fashion.  At the termination of the procedure, fundal pressure was applied and a moderate amount of lochia was expressed. The patient tolerated the procedure well. All needle, instrument and sponge counts were correct times two at the end of the procedure.    Shahnaz Reynoso MD on 10/15/2023

## 2023-10-15 NOTE — PLAN OF CARE
In PACU, I oriented the patient to the hospital breast pump, correct flange size (24) and cleaning supplies. I assisted the patient to double pump for 15 minutes followed by hand expression demonstration. RN did one side and the patient did one side. Drops of colostrum were collected on sterile cotton swabs, labeled and brought to NICU for infant mouth care.    100

## 2023-10-15 NOTE — PLAN OF CARE
Assumed care of patient from Anna Lopez RN, at 0715.  At that time, pt and  resting, room dark,  doing well, but going back to sleep.  EUS/TOCO remain in place and continuous monitoring achieved. Occasional variable decelerations continue every 90 to 120 minutes.  Overall, FHTs 130 baseline, moderate variability,  accelerations present at times.

## 2023-10-16 LAB — HGB BLD-MCNC: 11.1 G/DL (ref 11.7–15.7)

## 2023-10-16 PROCEDURE — 36415 COLL VENOUS BLD VENIPUNCTURE: CPT | Performed by: OBSTETRICS & GYNECOLOGY

## 2023-10-16 PROCEDURE — 250N000013 HC RX MED GY IP 250 OP 250 PS 637: Performed by: OBSTETRICS & GYNECOLOGY

## 2023-10-16 PROCEDURE — 85018 HEMOGLOBIN: CPT | Performed by: OBSTETRICS & GYNECOLOGY

## 2023-10-16 PROCEDURE — 250N000011 HC RX IP 250 OP 636: Mod: JZ | Performed by: OBSTETRICS & GYNECOLOGY

## 2023-10-16 PROCEDURE — 120N000012 HC R&B POSTPARTUM

## 2023-10-16 RX ADMIN — ACETAMINOPHEN 975 MG: 325 TABLET, FILM COATED ORAL at 18:17

## 2023-10-16 RX ADMIN — KETOROLAC TROMETHAMINE 30 MG: 30 INJECTION, SOLUTION INTRAMUSCULAR; INTRAVENOUS at 00:35

## 2023-10-16 RX ADMIN — IBUPROFEN 800 MG: 400 TABLET ORAL at 18:16

## 2023-10-16 RX ADMIN — ACETAMINOPHEN 975 MG: 325 TABLET, FILM COATED ORAL at 12:31

## 2023-10-16 RX ADMIN — ACETAMINOPHEN 975 MG: 325 TABLET, FILM COATED ORAL at 00:35

## 2023-10-16 RX ADMIN — KETOROLAC TROMETHAMINE 30 MG: 30 INJECTION, SOLUTION INTRAMUSCULAR; INTRAVENOUS at 05:39

## 2023-10-16 RX ADMIN — IBUPROFEN 800 MG: 400 TABLET ORAL at 12:31

## 2023-10-16 RX ADMIN — SENNOSIDES AND DOCUSATE SODIUM 1 TABLET: 50; 8.6 TABLET ORAL at 20:55

## 2023-10-16 RX ADMIN — SENNOSIDES AND DOCUSATE SODIUM 1 TABLET: 50; 8.6 TABLET ORAL at 08:27

## 2023-10-16 RX ADMIN — ACETAMINOPHEN 975 MG: 325 TABLET, FILM COATED ORAL at 05:41

## 2023-10-16 ASSESSMENT — ACTIVITIES OF DAILY LIVING (ADL)
ADLS_ACUITY_SCORE: 18

## 2023-10-16 NOTE — PLAN OF CARE
Goal Outcome Evaluation:  Patient states her left ear is plugged since 2 weeks not getting any better,denies any pain,requesting for ear drops. notified. stated that AM rounding provider will assess ear tomorrow.

## 2023-10-16 NOTE — LACTATION NOTE
Initial visit Baby 32+2 in NICU.    Breastfeeding general information reviewed.   Advised to Pump  8-12x/day.  Explained benefits of holding and skin to skin.  Encouraged lots of skin to skin when able in NICU.   Instructed on hand expression.   No further questions at this time.   Will follow as needed.   Sameera VIDALN, RN, PHN, RNC-MNN, IBCLC

## 2023-10-16 NOTE — PLAN OF CARE
Data: Monique Allen transferred to [facility] via cart at 1855. Baby remains in NICU.  Mother able to visit in NICU for 30 mintues.    Action: Receiving unit notified of transfer: Yes. Patient and family notified of room change. Report given to Rupinder Bowens RN at 1830 over the phone and then again at transfer at 1900. Belongings sent to receiving unit. Accompanied by Registered Nurse. Oriented patient to surroundings. Call light within reach.  Response: Patient tolerated transfer and is stable.

## 2023-10-16 NOTE — PROGRESS NOTES
[facility]       DAILY NOTE - POSTPARTUM DAY 1     SUBJECTIVE:     Pain controlled? Yes  Tolerating a regular diet? YES  Ambulating? YES  Voiding without difficulty? Yes        OBJECTIVE:  Vitals:    10/16/23 0541 10/16/23 0545 10/16/23 0637 10/16/23 0831   BP:  115/70  129/85   BP Location:  Right arm  Right arm   Patient Position:       Cuff Size:       Pulse:  58  56   Resp: 16 16 16 16   Temp:  98.2  F (36.8  C)  97.8  F (36.6  C)   TempSrc:  Oral  Oral   SpO2:  100%  100%       Constitutional: healthy, alert, and no distress    Abdomen:  Uterine fundus is firm, non-tender and at the level of the umbilicus     Incision: Healing well      LABS:  Hemoglobin   Date Value Ref Range Status   10/13/2023 12.0 11.7 - 15.7 g/dL Final   2023 12.4 11.7 - 15.7 g/dL Final       ASSESSMENT:  Post-partum day #1 s/p  Section  Pregnancy complicated by IUGR, absent diastolic flow on doppler    Doing well.       PLAN:   Continue routine postpartum cares  Hbg today    Evelyne Landers PA-C

## 2023-10-16 NOTE — PLAN OF CARE
Goal Outcome Evaluation:  VSS on RA.  Fundus firm, midline, U/ 3 .  Scant lochia rubra.  Incision WDL.  Pt had indwelling catheter which was patent w/adequate output. Amin removed at 0200. Pt is able to empty bladder independently. Voiding adequately.  Up with stand-by assist.  Pain managed w/Tylenol and Toradol.  Pt pumping, infant in NICU. Spouse supportive at bedside.  Nursing to continue to monitor.

## 2023-10-16 NOTE — PLAN OF CARE
Vital signs stable. Postpartum assessment WDL. Incision clean&dry. Pain controlled with tylenol&ibuprofen. Up independently in room free of dizziness.voiding with out difficulty.Pumping every 3 hours  for baby in NICU.Will continue with current plan of care.

## 2023-10-16 NOTE — PLAN OF CARE
The pt was transferred to the unit at 1850, the call light was placed near the pt and she was encouraged to call for questions.  Her spouse is at the bedside and supportive.  VSS and pain well-controlled.

## 2023-10-17 LAB
PATH REPORT.COMMENTS IMP SPEC: NORMAL
PATH REPORT.COMMENTS IMP SPEC: NORMAL
PATH REPORT.FINAL DX SPEC: NORMAL
PATH REPORT.GROSS SPEC: NORMAL
PATH REPORT.MICROSCOPIC SPEC OTHER STN: NORMAL
PATH REPORT.RELEVANT HX SPEC: NORMAL
PHOTO IMAGE: NORMAL

## 2023-10-17 PROCEDURE — 250N000013 HC RX MED GY IP 250 OP 250 PS 637: Performed by: OBSTETRICS & GYNECOLOGY

## 2023-10-17 PROCEDURE — 120N000012 HC R&B POSTPARTUM

## 2023-10-17 RX ORDER — MORPHINE SULFATE 1 MG/ML
INJECTION, SOLUTION EPIDURAL; INTRATHECAL; INTRAVENOUS PRN
Status: DISCONTINUED | OUTPATIENT
Start: 2023-10-15 | End: 2023-10-17

## 2023-10-17 RX ADMIN — SENNOSIDES AND DOCUSATE SODIUM 1 TABLET: 50; 8.6 TABLET ORAL at 20:51

## 2023-10-17 RX ADMIN — ACETAMINOPHEN 975 MG: 325 TABLET, FILM COATED ORAL at 00:05

## 2023-10-17 RX ADMIN — ACETAMINOPHEN 975 MG: 325 TABLET, FILM COATED ORAL at 06:03

## 2023-10-17 RX ADMIN — IBUPROFEN 800 MG: 400 TABLET ORAL at 06:03

## 2023-10-17 RX ADMIN — ACETAMINOPHEN 975 MG: 325 TABLET, FILM COATED ORAL at 20:51

## 2023-10-17 RX ADMIN — ACETAMINOPHEN 975 MG: 325 TABLET, FILM COATED ORAL at 13:46

## 2023-10-17 RX ADMIN — IBUPROFEN 800 MG: 400 TABLET ORAL at 20:51

## 2023-10-17 RX ADMIN — IBUPROFEN 800 MG: 400 TABLET ORAL at 13:46

## 2023-10-17 RX ADMIN — IBUPROFEN 800 MG: 400 TABLET ORAL at 00:05

## 2023-10-17 ASSESSMENT — ACTIVITIES OF DAILY LIVING (ADL)
ADLS_ACUITY_SCORE: 18

## 2023-10-17 NOTE — PROGRESS NOTES
Received call from RN regarding bps.  Reviewed bp readings with on-call Dr. Fernandez.  Continue to monitor for now.  If persistently 140s/90s please contact Dr. Fernandez

## 2023-10-17 NOTE — PLAN OF CARE
Vital signs stable. Postpartum assessment WDL. Pain controlled with ibuprofen and tylenol. Patient voiding without difficulty. Pumping minimum every 3 hours. Patient and spouse visited infant in the NICU x1. Plan of care ongoing.

## 2023-10-17 NOTE — PROVIDER NOTIFICATION
Evelyne Landers PA-C updated regarding elevated BPs 130s/90s (HR 50-60s).  The pt denies HA, epigastric and flank pain, visual changes, and nausea.  Dr. Fernandez consulted and per JACKIE, continue monitoring BPs per protocol and update if BPs remain elevated.  The pt was updated and all questions answered.

## 2023-10-17 NOTE — PROGRESS NOTES
[facility]       DAILY NOTE - POSTPARTUM DAY 2     SUBJECTIVE:     Pain controlled? Yes  Tolerating a regular diet? YES  Ambulating? YES  Voiding without difficulty? Yes    Pt doing well.  Lochia minimal.  Pumping every 3 hours  C/o 2wk hx of left ear plugged.  No URI sx, fevers, drainage.  No at home treatments    OBJECTIVE:  Vitals:    10/16/23 0831 10/16/23 1231 10/16/23 1551 10/17/23 0005   BP: 129/85 122/83 126/81 125/86   BP Location: Right arm Right arm Right arm Left arm   Patient Position:    Semi-Valdovinos's   Cuff Size:    Adult Regular   Pulse: 56 64 65 76   Resp:    Temp: 97.8  F (36.6  C) 98.1  F (36.7  C) 98  F (36.7  C) 97.8  F (36.6  C)   TempSrc: Oral Oral Oral Oral   SpO2: 100% 100%         Constitutional: healthy, alert, and no distress    Abdomen:  Uterine fundus is firm, non-tender and at the level of the umbilicus     Incision: Healing well    Ear: left ear cerumen in canal    LABS:  Hemoglobin   Date Value Ref Range Status   10/16/2023 11.1 (L) 11.7 - 15.7 g/dL Final   10/13/2023 12.0 11.7 - 15.7 g/dL Final       ASSESSMENT:  Post-partum day #2 s/p  Section  Pregnancy complicated by severe IUGR, AEDF  Cerumen impaction left ear    Doing well.       PLAN:   Continue routine postpartum cares  Will order left ear irrigation  Anticipate discharge tomorrow    Evelyne Landers PA-C

## 2023-10-18 VITALS
RESPIRATION RATE: 16 BRPM | HEART RATE: 81 BPM | DIASTOLIC BLOOD PRESSURE: 76 MMHG | SYSTOLIC BLOOD PRESSURE: 117 MMHG | WEIGHT: 151.8 LBS | TEMPERATURE: 97.6 F | OXYGEN SATURATION: 100 %

## 2023-10-18 PROCEDURE — G0008 ADMIN INFLUENZA VIRUS VAC: HCPCS | Performed by: OBSTETRICS & GYNECOLOGY

## 2023-10-18 PROCEDURE — 90686 IIV4 VACC NO PRSV 0.5 ML IM: CPT | Performed by: OBSTETRICS & GYNECOLOGY

## 2023-10-18 PROCEDURE — 3E02340 INTRODUCTION OF INFLUENZA VACCINE INTO MUSCLE, PERCUTANEOUS APPROACH: ICD-10-PCS | Performed by: OBSTETRICS & GYNECOLOGY

## 2023-10-18 PROCEDURE — 250N000011 HC RX IP 250 OP 636: Performed by: OBSTETRICS & GYNECOLOGY

## 2023-10-18 PROCEDURE — 250N000013 HC RX MED GY IP 250 OP 250 PS 637: Performed by: OBSTETRICS & GYNECOLOGY

## 2023-10-18 RX ORDER — IBUPROFEN 800 MG/1
800 TABLET, FILM COATED ORAL EVERY 6 HOURS
COMMUNITY
Start: 2023-10-18 | End: 2024-10-02

## 2023-10-18 RX ORDER — OXYCODONE HYDROCHLORIDE 5 MG/1
5 TABLET ORAL EVERY 4 HOURS PRN
Qty: 10 TABLET | Refills: 0 | Status: SHIPPED | OUTPATIENT
Start: 2023-10-18 | End: 2024-10-02

## 2023-10-18 RX ORDER — BREAST PUMP
1 EACH MISCELLANEOUS ONCE
Qty: 1 EACH | Refills: 0 | Status: SHIPPED | OUTPATIENT
Start: 2023-10-18 | End: 2023-10-18

## 2023-10-18 RX ORDER — ACETAMINOPHEN 325 MG/1
975 TABLET ORAL EVERY 6 HOURS
COMMUNITY
Start: 2023-10-18

## 2023-10-18 RX ADMIN — ACETAMINOPHEN 975 MG: 325 TABLET, FILM COATED ORAL at 03:08

## 2023-10-18 RX ADMIN — ACETAMINOPHEN 975 MG: 325 TABLET, FILM COATED ORAL at 14:47

## 2023-10-18 RX ADMIN — IBUPROFEN 800 MG: 400 TABLET ORAL at 03:08

## 2023-10-18 RX ADMIN — IBUPROFEN 800 MG: 400 TABLET ORAL at 09:20

## 2023-10-18 RX ADMIN — IBUPROFEN 800 MG: 400 TABLET ORAL at 14:47

## 2023-10-18 RX ADMIN — ACETAMINOPHEN 975 MG: 325 TABLET, FILM COATED ORAL at 09:20

## 2023-10-18 RX ADMIN — INFLUENZA A VIRUS A/VICTORIA/4897/2022 IVR-238 (H1N1) ANTIGEN (FORMALDEHYDE INACTIVATED), INFLUENZA A VIRUS A/DARWIN/9/2021 SAN-010 (H3N2) ANTIGEN (FORMALDEHYDE INACTIVATED), INFLUENZA B VIRUS B/PHUKET/3073/2013 ANTIGEN (FORMALDEHYDE INACTIVATED), AND INFLUENZA B VIRUS B/MICHIGAN/01/2021 ANTIGEN (FORMALDEHYDE INACTIVATED) 0.5 ML: 15; 15; 15; 15 INJECTION, SUSPENSION INTRAMUSCULAR at 09:22

## 2023-10-18 ASSESSMENT — ACTIVITIES OF DAILY LIVING (ADL)
ADLS_ACUITY_SCORE: 18

## 2023-10-18 NOTE — PROGRESS NOTES
[facility]       DAILY NOTE - POSTPARTUM DAY 3     SUBJECTIVE:     Pain controlled? Yes  Tolerating a regular diet? YES  Ambulating? YES  Voiding without difficulty? Yes    OBJECTIVE:  Vitals:    10/17/23 2057 10/18/23 0117 10/18/23 0308 10/18/23 0750   BP: 114/71 122/84 117/82 117/76   BP Location: Left arm Left arm Left arm    Patient Position: Semi-Valdovinos's Semi-Valdovinos's Semi-Valdovinos's    Cuff Size:   Adult Regular    Pulse: 97 79 70 81   Resp:    Temp: 98  F (36.7  C) 97.6  F (36.4  C) 98  F (36.7  C) 97.6  F (36.4  C)   TempSrc: Oral Oral Oral Oral   SpO2:           Constitutional: healthy, alert, and no distress    Abdomen:  Uterine fundus is firm, non-tender and at the level of the umbilicus     Incision: Healing well, well approximated, and no drainage      LABS:  Hemoglobin   Date Value Ref Range Status   10/16/2023 11.1 (L) 11.7 - 15.7 g/dL Final   10/13/2023 12.0 11.7 - 15.7 g/dL Final       ASSESSMENT:  Post-partum day #3 s/p  Section  Pregnancy complicated by IUGR, AEDF    Doing well.  Clean wound without signs of infection.  Normal healing wound.  No excessive bleeding  Pain well-controlled.       PLAN:   Discharge today.  Return to clinic in 2 and 6 weeks.  Continue routine postpartum cares    JUANIS Liao CNP

## 2023-10-18 NOTE — PLAN OF CARE
Vital signs stable. Postpartum assessment WDL. Pain controlled with tylenol and ibuprofen. Patient voiding without difficulty. Pumping every 2-3 hours. Patient visits infant in the NICU, bonding well. Plan of care ongoing.

## 2023-10-18 NOTE — CONSULTS
"SWS Progress Note    Data: SW consult for postpartum assessment due to score of 11 on the EPDS. Pt is Monique, a 24yo who delivered her baby, Re, on 10/15/23. Pt spouse/significant other and FOB is Fatemeh who is present and supportive. Parents have no other children.  Intervention: SW has reviewed pt records. SW met with pt this morning to introduce self/role, perform assessment, and discuss resources. SW inquired about pt mental health history, pt shared she has no history of depression and anxiety. Pt has no experience with postpartum depression/anxiety. Pt has not on medications in the past. SW normalized \"rollercoaster\" of emotions that may occur following delivery as well as discussed s/s that may be a concern for potential PPD/PPA. Pt has insight on the s/s to look for, SW discussed techniques for coping and the importance of family awareness and support. SW also encouraged pt to alert her MD of any concerns at her follow-up appointment. SW discussed PPD/PPA resource folder and provided brief overview of information included. Pt appreciative of the resources. Pt feels prepared for discharge and has no additional questions/concerns.  Assessment: Pt pleasant and welcoming of SW involvement. Pt observed to have happy affect during conversation but did become a little teary. SW allowed space for pt to share thoughts/concerns re: PPD/PPA. SW provided reflective listening and supportive counseling. Parents are supportive of one another, bonding well with baby, no concerns.  Plan: No further SW follow-up indicated. Pt and baby to discharge today with above mentioned resources. SW provided this writer's contact information if any specific questions arise about the resources provided.   Yolande Restrepo, RAMÓNSW  "

## 2023-10-18 NOTE — DISCHARGE INSTRUCTIONS
Warning Signs after Having a Baby    Keep this paper on your fridge or somewhere else where you can see it.    Call your provider if you have any of these symptoms up to 12 weeks after having your baby.    Thoughts of hurting yourself or your baby  Pain in your chest or trouble breathing  Severe headache not helped by pain medicine  Eyesight concerns (blurry vision, seeing spots or flashes of light, other changes to eyesight)  Fainting, shaking or other signs of a seizure    Call 9-1-1 if you feel that it is an emergency.     The symptoms below can happen to anyone after giving birth. They can be very serious. Call your provider if you have any of these warning signs.    My provider s phone number: _______________________    Losing too much blood (hemorrhage)    Call your provider if you soak through a pad in less than an hour or pass blood clots bigger than a golf ball. These may be signs that you are bleeding too much.    Blood clots in the legs or lungs    After you give birth, your body naturally clots its blood to help prevent blood loss. Sometimes this increased clotting can happen in other areas of the body, like the legs or lungs. This can block your blood flow and be very dangerous.     Call your provider if you:  Have a red, swollen spot on the back of your leg that is warm or painful when you touch it.   Are coughing up blood.     Infection    Call your provider if you have any of these symptoms:  Fever of 100.4 F (38 C) or higher.  Pain or redness around your stitches if you had an incision.   Any yellow, white, or green fluid coming from places where you had stitches or surgery.    Mood Problems (postpartum depression)    Many people feel sad or have mood changes after having a baby. But for some people, these mood swings are worse.     Call your provider right away if you feel so anxious or nervous that you can't care for yourself or your baby.    Preeclampsia (high blood pressure)    Even if you  didn't have high blood pressure when you were pregnant, you are at risk for the high blood pressure disease called preeclampsia. This risk can last up to 12 weeks after giving birth.     Call your provider if you have:   Pain on your right side under your rib cage  Sudden swelling in the hands and face    Remember: You know your body. If something doesn't feel right, get medical help.     For informational purposes only. Not to replace the advice of your health care provider. Copyright 2020 [facility]. All rights reserved. Clinically reviewed by Delilah Rodriguez, RNC-OB, MSN. OrdrIt 524160 - Rev .    Postop  Birth Instructions    Activity     Do not lift more than 10 pounds for 6 weeks after surgery.  Ask family and friends for help when you need it.  No driving until you have stopped taking your pain medications (usually two weeks after surgery).  No heavy exercise or activity for 6 weeks.  Don't do anything that will put a strain on your surgery site.  Don't strain when using the toilet.  Your care team may prescribe a stool softener if you have problems with your bowel movements.     To care for your incision:     Keep the incision clean and dry.  Do not soak your incision in water. No swimming or hot tubs until it has fully healed. You may soak in the bathtub if the water level is below your incision.  Do not use peroxide, gel, cream, lotion, or ointment on your incision.  Adjust your clothes to avoid pressure on your surgery site (check the elastic in your underwear for example).     You may see a small amount of clear or pink drainage and this is normal.  Check with your health care provider:     If the drainage increases or has an odor.  If the incision reddens, you have swelling, or develop a rash.  If you have increased pain and the medicine we prescribed doesn't help.  If you have a fever above 100.4 F (38 C) with or without chills when placing thermometer under your tongue.    The area around your incision (surgery wound), will feel numb.  This is normal. The numbness should go away in less than a year.     Keep your hands clean:  Always wash your hands before touching your incision (surgery wound). This helps reduce your risk of infection. If your hands aren't dirty, you may use an alcohol hand-rub to clean your hands. Keep your nails clean and short.    Call your healthcare provider if you have any of these symptoms:     You soak a sanitary pad with blood within 1 hour, or you see blood clots larger than a golf ball.  Bleeding that lasts more than 6 weeks.  Vaginal discharge that smells bad.  Severe pain, cramping or tenderness in your lower belly area.  A need to urinate more frequently (use the toilet more often), more urgently (use the toilet very quickly), or it burns when you urinate.  Nausea and vomiting.  Redness, swelling or pain around a vein in your leg.  Problems breastfeeding or a red or painful area on your breast.  Chest pain and cough or are gasping for air.  Problems with coping with sadness, anxiety or depression. If you have concerns about hurting yourself or the baby, call your provider immediately.    You have questions or concerns after you return home.

## 2023-10-18 NOTE — PLAN OF CARE
Goal Outcome Evaluation:      Plan of Care Reviewed With: patient, spouse    Overall Patient Progress: improving  Pt's pain controlled with Ibuprofen/Tylenol. Up and about in room and walking down to NICU to visit . Pumping every three hours. Going home today without .

## 2023-10-26 NOTE — DISCHARGE SUMMARY
[facility] Discharge Summary    Monique Allen MRN# [id number]   Age: 23 year old YOB: 2000     Date of Admission:  10/13/2023  Date of Discharge::  10/18/2023  3:03 PM  Admitting Physician:  Christofer Fernandez MD  Discharge Physician:  Shahnaz Reynoso MD     Home clinic: [facility]          Admission Diagnoses:   IUP at 32 weeks gestation  Severe IUGR   Breech presentation          Discharge Diagnosis:     Same  S/p PLTCS          Procedures:     Procedure(s): Primary low transverse  section       No other procedures performed during this admission           Medications Prior to Admission:     No medications prior to admission.             Discharge Medications:     Discharge Medication List as of 10/18/2023 10:07 AM        START taking these medications    Details   acetaminophen (TYLENOL) 325 MG tablet Take 3 tablets (975 mg) by mouth every 6 hours, OTC      ibuprofen (ADVIL/MOTRIN) 800 MG tablet Take 1 tablet (800 mg) by mouth every 6 hours, OTC      oxyCODONE (ROXICODONE) 5 MG tablet Take 1 tablet (5 mg) by mouth every 4 hours as needed, Disp-10 tablet, R-0, Local Print           CONTINUE these medications which have NOT CHANGED    Details   Prenatal Vit-Fe Fumarate-FA (PRENATAL MULTIVITAMIN  PLUS IRON) 27-1 MG TABS Take by mouth daily, Historical                   Consultations:   No consultations were requested during this admission          Brief History of Labor or Admission:   The patient was admitted to antepartum at the request of perinatology due to ultrasound findings of severe IUGR with intermittent AEDF with recommendations for administration of ANCS and delivery. She received her two doses of ANCS and was delivered via PLTCS on HD#3. She delivered a viable male infant with APGARS of 8, 3, and 9 at 1, 5, and 10 min respectively with a weight of 1150g.            Hospital Course:   The patient's hospital course was unremarkable.  She recovered as  anticipated and experienced no post-operative complications.  On discharge, her pain was well controlled. Vaginal bleeding is similar to peak menstrual flow.  Voiding without difficulty.  Ambulating well and tolerating a normal diet.  No fever or significant wound drainage.  Pumping well.  Infant is stable in the NICU.  No bowel movement yet.  She was discharged on post-partum day #3.    Post-partum hemoglobin:   Hemoglobin   Date Value Ref Range Status   10/16/2023 11.1 (L) 11.7 - 15.7 g/dL Final             Discharge Instructions and Follow-Up:     Discharge diet: Regular   Discharge activity: No lifting, driving, or strenuous exercise for 6 week(s)  No driving or operating machinery while on narcotic analgesics  Pelvic rest: abstain from intercourse and do not use tampons for 6 week(s)   Discharge follow-up: Follow up with primary care provider in 2 weeks   Wound care: Drink plenty of fluids  Ice to area for comfort  Keep wound clean and dry           Discharge Disposition:     Discharged to home      Attestation:  I have reviewed today's vital signs, notes, medications, labs and imaging.    Shahnaz Reynoso MD

## 2023-10-27 ENCOUNTER — LACTATION ENCOUNTER (OUTPATIENT)
Age: 23
End: 2023-10-27

## 2023-10-27 NOTE — LACTATION NOTE
This note was copied from a baby's chart.  Taking care of this family in NICU today and offered lactation support. Corrected to 34 weeks today. Dad here at 1600 and answering milk storing questions. They have been keeping track of pumped volumes and gave Dad information on apps to try if desired. Mom with good milk supply.  Encouraged Dad to bring Mom when she feels up to it. Dad grateful for the information. Will follow as able.     PAULETTE Lowe RNC, IBCLC

## 2023-11-28 ENCOUNTER — LACTATION ENCOUNTER (OUTPATIENT)
Age: 23
End: 2023-11-28

## 2023-11-29 ENCOUNTER — LACTATION ENCOUNTER (OUTPATIENT)
Age: 23
End: 2023-11-29

## 2023-11-29 NOTE — LACTATION NOTE
"This note was copied from a baby's chart.      Lactation Admission Note      Baby Information:  Infant's first name:  \"Godwin\"  Infant medical history: genetic workup, possible ENT for swallow study but OT is waiting till they get to know baby better per note. Also, no concerns per OT for aspiration, plan to either breast or bottle, family is preferring bottle.  Infant is 44 days of age.     needed? No    Lactation goal (if known): pump and bottle only, had tried breastfeeding but currently wanting to get infant use to the bottle only.   Lactation history: had tried at breast once or twice daily, currently only wanting to pump / bottle. Expressing 500 mL / Day. Pumping 30 minutes X 7 daily using Spectra S1 + pump.  Had tried the Symphony and did not want to continue using the Symphony, felt that it was reducing the amount expressed.  Letting down sometimes takes up to 5 minutes while in the 70 cycle / minutes mode of the Spectra before drops start.     Lactation Plan (Pumping)   Discussed variations she can try with pumping to shorten the length while still expressing the same amount:    1.) use the higher frequency mode once she cycles out of the 70 cycles / minutes, for examples, Monique is currently using the spectra at a frequency of 38 once she cycles down from the initial 70. Encouraged her to try going up to a 42 to see if she can tolerate this and see if it helps to empty breasts a little faster.  She can continue to increase the frequency if that helps, until getting to the maximum (believe it is 54).   2.) try getting hands on breasts more often while pumping, open palm massage with small [place] motions.    3.) consider using a silicon insert on the right breast which has been painful (breast tissue behind areola, infrequent) intermittently.    4.) Start stretching upper body to focus on the arms and stretching them in back motion, or folding hands behind head to help \"open\" and stretch the " chest to allow increased blood flow and improve nerve communication to help reduce pain   5.) Consider coconut oil on nipples / areola prior to pumping and / or after pumping to be replaced with the current lanolin Monique is using.    6.) consider measure or technique to increase volume IF desired - try puming X1 more per day up to X2 more per day (however, this may not be tolerated as Monique is already pumping for 30 minutes X7 daily!).  7.) Consider outpatient lactation consult for increasing supply (Dena Alex possibly? - herbal combinations). Note: Monique is already taking MotherMilk tea with fenugreek but hasn't been consistent with this and doesn't have it in NICU - she did feel that it didn't make any difference in her supply when using it or not.  7.) Mindfulness when pumping / relaxation measures to help facilitate the let down.    8.  Lactation to follow up tomorrow after (assess if changing pumping speed/cycle makes any difference)    Mother's Information: Monique Allen  Occupation: not employed - will be staying with baby while in NICU  Age: 23 years  Delivery type:     [place]: [place], [place] (born in [facility])  Pump for home use: Using Spectra    Partner's name: Fatemeh Cannon  Occupation: unknown    Relevant maternal medical and social hx:     (NOTE - see maternal data and prenatal history report to review, select from baby index report), Maternal past medical history, problem list and prior to admission medications reviewed and unremarkable., Maternal past medical history, problem list and prior to admission medications reviewed and notable for the following:  Information for the patient's mother:  Monique Allen [[id number]]   No past medical history on file. ,   Information for the patient's mother:  Monique Allen [[id number]]     Patient Active Problem List   Diagnosis    Encounter for triage in pregnant patient    Indication for care in labor or delivery     ,   Information for the patient's mother:  Monique Allen [[id number]]     No medications prior to admission.    , Maternal complications: intrauterine growth retardation.      Relevant maternal medications:   N/a  Maternal risk factors:  N/A     Admission Education given:  []Admission packet - has Journey Book from NICU - will see if they need one of our NICU books tomorrow (possibly do not need)  []Kangaroo care (N/A)  [x]Benefits of breast milk  [x]How breast milk is made  [x]Stages of milk production  [x]Milk supply/ goal volumes  [x]Hand expression  [x]Hands-on pumping  []Collecting, labeling, transporting milk - plan to review on 11/29 in more detail if needed- family is already aware of collecting / and plans to pump at hospital.  []Cleaning, sanitizing pump parts - plan to review on 11/29 in more detail if needed - already managing the pumping parts / cleaning  [x]Storage of milk      Luz Maria Gonzalez RN, IBCLC   Lactation Consultant  Ascom: *[phone]  Office: [phone]

## 2023-11-30 NOTE — LACTATION NOTE
This note was copied from a baby's chart.  Lactation follow up  Checked in with family, infant's mother still just pumping her milk for infant. She is sometimes sore on the right breast - once in morning and once in afternoon, and has thought about ordering silicon inserts. She has adjusted some of the pumping sessions with a quicker cycle (up to 42 setting on the Spectra for X2 pumping sessions today). Will continue to adjust and is using a lanolin oil for breasts.    Encouraged her to call her OB/GYN for guidance for pain in breast. Reviewed symptoms of mastitis and she denies any signs or symptoms of mastitis.    Luz Maria Gonzalez RN, IBCLC on 11/29/2023 at 9:09 PM     chills

## 2024-02-16 ENCOUNTER — MEDICAL CORRESPONDENCE (OUTPATIENT)
Dept: HEALTH INFORMATION MANAGEMENT | Facility: CLINIC | Age: 24
End: 2024-02-16
Payer: COMMERCIAL

## 2024-10-02 ENCOUNTER — OFFICE VISIT (OUTPATIENT)
Dept: FAMILY MEDICINE | Facility: CLINIC | Age: 24
End: 2024-10-02
Payer: COMMERCIAL

## 2024-10-02 VITALS
TEMPERATURE: 102.6 F | BODY MASS INDEX: 27.64 KG/M2 | DIASTOLIC BLOOD PRESSURE: 73 MMHG | OXYGEN SATURATION: 100 % | HEART RATE: 91 BPM | SYSTOLIC BLOOD PRESSURE: 109 MMHG | WEIGHT: 150.19 LBS | RESPIRATION RATE: 20 BRPM | HEIGHT: 62 IN

## 2024-10-02 DIAGNOSIS — R50.9 FEVER, UNSPECIFIED FEVER CAUSE: Primary | ICD-10-CM

## 2024-10-02 DIAGNOSIS — D72.819 LEUKOPENIA, UNSPECIFIED TYPE: ICD-10-CM

## 2024-10-02 DIAGNOSIS — D64.9 ANEMIA, UNSPECIFIED TYPE: ICD-10-CM

## 2024-10-02 LAB
ALBUMIN UR-MCNC: NEGATIVE MG/DL
ANION GAP SERPL CALCULATED.3IONS-SCNC: 9 MMOL/L (ref 7–15)
APPEARANCE UR: CLEAR
BACTERIA #/AREA URNS HPF: ABNORMAL /HPF
BILIRUB UR QL STRIP: NEGATIVE
BUN SERPL-MCNC: 7.2 MG/DL (ref 6–20)
CALCIUM SERPL-MCNC: 8.5 MG/DL (ref 8.8–10.4)
CHLORIDE SERPL-SCNC: 104 MMOL/L (ref 98–107)
COLOR UR AUTO: YELLOW
CREAT SERPL-MCNC: 0.64 MG/DL (ref 0.51–0.95)
CRP SERPL-MCNC: 3.62 MG/L
EGFRCR SERPLBLD CKD-EPI 2021: >90 ML/MIN/1.73M2
ERYTHROCYTE [DISTWIDTH] IN BLOOD BY AUTOMATED COUNT: 12 % (ref 10–15)
ERYTHROCYTE [SEDIMENTATION RATE] IN BLOOD BY WESTERGREN METHOD: 29 MM/HR (ref 0–20)
GLUCOSE SERPL-MCNC: 91 MG/DL (ref 70–99)
GLUCOSE UR STRIP-MCNC: NEGATIVE MG/DL
HCO3 SERPL-SCNC: 24 MMOL/L (ref 22–29)
HCT VFR BLD AUTO: 35.9 % (ref 35–47)
HGB BLD-MCNC: 11.6 G/DL (ref 11.7–15.7)
HGB UR QL STRIP: ABNORMAL
KETONES UR STRIP-MCNC: 15 MG/DL
LEUKOCYTE ESTERASE UR QL STRIP: NEGATIVE
MCH RBC QN AUTO: 27 PG (ref 26.5–33)
MCHC RBC AUTO-ENTMCNC: 32.3 G/DL (ref 31.5–36.5)
MCV RBC AUTO: 84 FL (ref 78–100)
MUCOUS THREADS #/AREA URNS LPF: PRESENT /LPF
NITRATE UR QL: NEGATIVE
PH UR STRIP: 6 [PH] (ref 5–7)
PLATELET # BLD AUTO: 213 10E3/UL (ref 150–450)
POTASSIUM SERPL-SCNC: 4.1 MMOL/L (ref 3.4–5.3)
RBC # BLD AUTO: 4.3 10E6/UL (ref 3.8–5.2)
RBC #/AREA URNS AUTO: ABNORMAL /HPF
SODIUM SERPL-SCNC: 137 MMOL/L (ref 135–145)
SP GR UR STRIP: 1.02 (ref 1–1.03)
SQUAMOUS #/AREA URNS AUTO: ABNORMAL /LPF
TSH SERPL DL<=0.005 MIU/L-ACNC: 2.68 UIU/ML (ref 0.3–4.2)
UROBILINOGEN UR STRIP-ACNC: 0.2 E.U./DL
WBC # BLD AUTO: 3.3 10E3/UL (ref 4–11)
WBC #/AREA URNS AUTO: ABNORMAL /HPF

## 2024-10-02 PROCEDURE — 36415 COLL VENOUS BLD VENIPUNCTURE: CPT | Performed by: FAMILY MEDICINE

## 2024-10-02 PROCEDURE — 86140 C-REACTIVE PROTEIN: CPT | Performed by: FAMILY MEDICINE

## 2024-10-02 PROCEDURE — 80048 BASIC METABOLIC PNL TOTAL CA: CPT | Performed by: FAMILY MEDICINE

## 2024-10-02 PROCEDURE — 84443 ASSAY THYROID STIM HORMONE: CPT | Performed by: FAMILY MEDICINE

## 2024-10-02 PROCEDURE — 99204 OFFICE O/P NEW MOD 45 MIN: CPT | Performed by: FAMILY MEDICINE

## 2024-10-02 PROCEDURE — 85027 COMPLETE CBC AUTOMATED: CPT | Performed by: FAMILY MEDICINE

## 2024-10-02 PROCEDURE — 85652 RBC SED RATE AUTOMATED: CPT | Performed by: FAMILY MEDICINE

## 2024-10-02 PROCEDURE — 81001 URINALYSIS AUTO W/SCOPE: CPT | Performed by: FAMILY MEDICINE

## 2024-10-02 RX ORDER — NAPROXEN 500 MG/1
500 TABLET ORAL 2 TIMES DAILY PRN
Qty: 30 TABLET | Refills: 0 | Status: SHIPPED | OUTPATIENT
Start: 2024-10-02

## 2024-10-02 ASSESSMENT — ENCOUNTER SYMPTOMS
DIAPHORESIS: 1
HEADACHES: 1
FEVER: 1
CHILLS: 1
FATIGUE: 1
MYALGIAS: 1
NECK PAIN: 1
WEAKNESS: 1

## 2024-10-02 ASSESSMENT — PAIN SCALES - GENERAL: PAINLEVEL: NO PAIN (0)

## 2024-10-02 NOTE — LETTER
October 4, 2024      Monique Allen  [address] [facility] [address]      Hi, Monique,  My apologies that I was out of the office yesterday and did not get back to you right away.  Most of the lab work actually looks quite normal.  Thyroid is normal.  Urine test pretty much unremarkable.  But the 2 things that I do note are a very mildly low white blood count and a mildly elevated sedimentation rate.  The sedimentation rate is a nonspecific marker of internal inflammation and the causes can range anywhere from illnesses to autoimmune disorders to all sorts of other things.  But when we see this combination of a slightly low white blood count and a mildly elevated sedimentation rate it makes me think of a viral infection because that can have those effects.  And that can certainly give other things such as body aches, fever, etc.  But I certainly do not want to miss anything.  So what I think we should do is recheck these numbers in roughly 2 weeks.  I will order up that lab work and that can be done at any [facility] lab.  No need to be fasting.  What I would expect is that these numbers have improved or gone back to normal.  But if for some reason they are not improving or worse than that might be a piece of information that we use to help solve things.  So in the meantime there is nothing specific to do because we do not know that there is anything wrong other than trying that medicine I sent.  But I do want to recheck on this.  LESIA Holbrook M.D.    Resulted Orders   CBC with platelets   Result Value Ref Range    WBC Count 3.3 (L) 4.0 - 11.0 10e3/uL    RBC Count 4.30 3.80 - 5.20 10e6/uL    Hemoglobin 11.6 (L) 11.7 - 15.7 g/dL    Hematocrit 35.9 35.0 - 47.0 %    MCV 84 78 - 100 fL    MCH 27.0 26.5 - 33.0 pg    MCHC 32.3 31.5 - 36.5 g/dL    RDW 12.0 10.0 - 15.0 %    Platelet Count 213 150 - 450 10e3/uL   TSH with free T4 reflex   Result Value Ref Range    TSH 2.68 0.30 - 4.20 uIU/mL    Erythrocyte sedimentation rate auto   Result Value Ref Range    Erythrocyte Sedimentation Rate 29 (H) 0 - 20 mm/hr   CRP inflammation   Result Value Ref Range    CRP Inflammation 3.62 <5.00 mg/L   UA Macroscopic with reflex to Microscopic and Culture - Lab Collect   Result Value Ref Range    Color Urine Yellow Colorless, Straw, Light Yellow, Yellow    Appearance Urine Clear Clear    Glucose Urine Negative Negative mg/dL    Bilirubin Urine Negative Negative    Ketones Urine 15 (A) Negative mg/dL    Specific Gravity Urine 1.025 1.003 - 1.035    Blood Urine Moderate (A) Negative    pH Urine 6.0 5.0 - 7.0    Protein Albumin Urine Negative Negative mg/dL    Urobilinogen Urine 0.2 0.2, 1.0 E.U./dL    Nitrite Urine Negative Negative    Leukocyte Esterase Urine Negative Negative   UA Microscopic with Reflex to Culture   Result Value Ref Range    Bacteria Urine Few (A) None Seen /HPF    RBC Urine 5-10 (A) 0-2 /HPF /HPF    WBC Urine 0-5 0-5 /HPF /HPF    Squamous Epithelials Urine Few (A) None Seen /LPF    Mucus Urine Present (A) None Seen /LPF    Narrative    Urine Culture not indicated   Basic metabolic panel  (Ca, Cl, CO2, Creat, Gluc, K, Na, BUN)   Result Value Ref Range    Sodium 137 135 - 145 mmol/L    Potassium 4.1 3.4 - 5.3 mmol/L    Chloride 104 98 - 107 mmol/L    Carbon Dioxide (CO2) 24 22 - 29 mmol/L    Anion Gap 9 7 - 15 mmol/L    Urea Nitrogen 7.2 6.0 - 20.0 mg/dL    Creatinine 0.64 0.51 - 0.95 mg/dL    GFR Estimate >90 >60 mL/min/1.73m2      Comment:      eGFR calculated using 2021 CKD-EPI equation.    Calcium 8.5 (L) 8.8 - 10.4 mg/dL      Comment:      Reference intervals for this test were updated on 7/16/2024 to reflect our healthy population more accurately. There may be differences in the flagging of prior results with similar values performed with this method. Those prior results can be interpreted in the context of the updated reference intervals.    Glucose 91 70 - 99 mg/dL

## 2024-10-02 NOTE — PROGRESS NOTES
"  Assessment & Plan     Fever, unspecified fever cause  My first visit with patient and previous history reviewed with her and in her chart.  Has been very healthy overall and has not really needed primary care.  But this all started about a week ago with some abdominal pain.  Never really had any vomiting or diarrhea, and the pain has since resolved.  But she has developed some bodyaches and fever and indeed today has a temp of greater than 102.  But she does not have any other symptoms.  A little bit of a headache, but no stuffy nose or sore throat or cough or really anything.  Exam was completely unremarkable.  So we really do not have a source of the fever at this point.  I think highest on the list is illness but something very nonspecific.  Currently menstruating.  Her baby at home is 11 months but not ill.  So I think we are left with running some test to figure out if there is a significant infection or whether this leans more toward bacterial versus virus, etc., most likely this is an unspecified viral illness that will resolve.  In the meantime we can try some naproxen and see if that helps with her body aches and fever.  But I will contact patient today with results.  - CBC with platelets; Future  - TSH with free T4 reflex; Future  - Erythrocyte sedimentation rate auto; Future  - CRP inflammation; Future  - UA Macroscopic with reflex to Microscopic and Culture - Lab Collect; Future  - naproxen (NAPROSYN) 500 MG tablet; Take 1 tablet (500 mg) by mouth 2 times daily as needed (fever or pain).  - CBC with platelets  - TSH with free T4 reflex  - Erythrocyte sedimentation rate auto  - CRP inflammation  - UA Macroscopic with reflex to Microscopic and Culture - Lab Collect          BMI  Estimated body mass index is 27.47 kg/m  as calculated from the following:    Height as of this encounter: 1.575 m (5' 2\").    Weight as of this encounter: 68.1 kg (150 lb 3 oz).         See Patient Instructions    Subjective " "  Monique is a 24 year old, presenting for the following health issues:  Fatigue (Recurrent body pains/Fever in the evenings to night/First symptom abdominal pain 7 days ago (lasted 2 days ago)  Mid lower abdomen with sharp pains but now gone)        10/2/2024    10:27 AM   Additional Questions   Roomed by Diane Lynne Schoenherr RN     History of Present Illness       Reason for visit:  Fever, body pains feeling weak   She is taking medications regularly.           Here today with some illness symptoms as above.      Review of Systems  Constitutional, HEENT, cardiovascular, pulmonary, gi and gu systems are negative, except as otherwise noted.      Objective    /73 (BP Location: Right arm, Patient Position: Sitting, Cuff Size: Adult Regular)   Pulse 91   Temp (!) 102.6  F (39.2  C) (Oral)   Resp 20   Ht 1.575 m (5' 2\")   Wt 68.1 kg (150 lb 3 oz)   LMP 09/15/2024 (Approximate)   SpO2 100%   Breastfeeding No   BMI 27.47 kg/m    Body mass index is 27.47 kg/m .  Physical Exam   Alert, pleasant, upbeat, and in no apparent discomfort.  Ears normal. Throat and pharynx normal. Neck supple. No adenopathy or masses in the neck or supraclavicular regions. Sinuses non tender.  S1 and S2 normal, no murmurs, clicks, gallops or rubs. Regular rate and rhythm. Chest is clear; no wheezes or rales. No edema or JVD.  The abdomen is soft without tenderness, guarding, mass, rebound or organomegaly. Bowel sounds are normal. No CVA tenderness or inguinal adenopathy noted.   Past labs reviewed with the patient.           Signed Electronically by: Sarah Holbrook MD    "

## 2024-10-04 NOTE — RESULT ENCOUNTER NOTE
Monique Lock,  My apologies that I was out of the office yesterday and did not get back to you right away.  Most of the lab work actually looks quite normal.  Thyroid is normal.  Urine test pretty much unremarkable.  But the 2 things that I do note are a very mildly low white blood count and a mildly elevated sedimentation rate.  The sedimentation rate is a nonspecific marker of internal inflammation and the causes can range anywhere from illnesses to autoimmune disorders to all sorts of other things.  But when we see this combination of a slightly low white blood count and a mildly elevated sedimentation rate it makes me think of a viral infection because that can have those effects.  And that can certainly give other things such as body aches, fever, etc.  But I certainly do not want to miss anything.  So what I think we should do is recheck these numbers in roughly 2 weeks.  I will order up that lab work and that can be done at any [facility] lab.  No need to be fasting.  What I would expect is that these numbers have improved or gone back to normal.  But if for some reason they are not improving or worse than that might be a piece of information that we use to help solve things.  So in the meantime there is nothing specific to do because we do not know that there is anything wrong other than trying that medicine I sent.  But I do want to recheck on this.  LESIA Holbrook M.D.

## 2024-10-06 ENCOUNTER — HEALTH MAINTENANCE LETTER (OUTPATIENT)
Age: 24
End: 2024-10-06

## 2025-03-14 NOTE — PROGRESS NOTES
L&D Triage Progress Note    HPI: Monique Allen is a 23 year old  at 24w4d by 7w1d US, here for prolonged fetal monitoring following prolonged decelerations in clinic following regularly scheduled ultrasound.    Pregnancy notable for:  - Fetal Growth Restriction, EFW <1%, AC <%, Elevated Dopplers  - Fetal VSD  - Two Vessel Cord    She states that she is feeling well today.  + FM, no ctx, VB, or LOF.  She denies fever, chills, HA, scotoma, CP, SOB, nausea, vomiting,  RUQ pain, constipation, diarrhea, dysuria, and acute swelling.    Lab Results   Component Value Date    AS Negative 2023    HEPBANG Nonreactive 2023    RPR Nonreactive 2023    HGB 12.4 2023       GBS Status:   No results found for: GBS          OBHX:  OB History    Para Term  AB Living   1 0 0 0 0 0   SAB IAB Ectopic Multiple Live Births   0 0 0 0 0      # Outcome Date GA Lbr Ken[place]/2nd Weight Sex Delivery Anes PTL Lv   1 Current                MedicalHX:  No past medical history on file.    SurgicalHX:  No past surgical history on file.    Medications:  No current facility-administered medications on file prior to encounter.  Prenatal Vit-Fe Fumarate-FA (PRENATAL MULTIVITAMIN  PLUS IRON) 27-1 MG TABS, Take by mouth daily        Allergies:  Allergies   Allergen Reactions    Sulfa Antibiotics        FamilyHX:    No family history on file.    SocialHX:  Social History     Socioeconomic History    Marital status:    Tobacco Use    Smoking status: Never     Passive exposure: Never    Smokeless tobacco: Never   Substance and Sexual Activity    Alcohol use: Never    Drug use: Never    Sexual activity: Yes     Partners: Male       ROS: 10-point ROS negative except as in HPI.    Physical Exam:  Vitals:    23 1010   Resp: 16   Temp: 98.8  F (37.1  C)   TempSrc: Oral     GEN: resting comfortably in bed, NAD   CV: Regular rate, well perfused  PULM: On room air, no increased work of breathing  ABD: soft,  Received fax from [facility] that Proctogram has been completed.   Results of study not included in fax.   Unable to upload from Care Everywhere.     Fax sent to [facility] Medical records    gravid, non-tender, non-distended    NST:  FHT: baseline 150, moderate variability, no accels, no decels  TOCO: no ctx in ten minutes    A/P: 23 year old  at 24w4d by 7w1d US, here for prolonged fetal monitoring following prolonged deceleration in [facility] clinic. Pregnancy notable for FGR, Fetal VSD and 2 vessel cord.     # Fetal Well-Being  - Continuous Monitoring for two hours without decelerations  - Patient stable for discharge    Dispo:   - To home with OB/GYN follow-up on 23  - Discussed labor warning signs and indications to return to care including: contractions, vaginal bleeding, leakage of fluid, decreased fetal movement, or fever over 100.4F    Patient discussed with Dr. Finn Valadez MD, MPH  Ob/Gyn Resident, PGY-3  23 5:07 PM

## (undated) DEVICE — SU VICRYL 3-0 CT-1 36" J338H

## (undated) DEVICE — ESU GROUND PAD UNIVERSAL W/O CORD

## (undated) DEVICE — DRSG STERI STRIP 1/2X4" R1547

## (undated) DEVICE — CATH TRAY FOLEY 16FR BARDEX W/DRAIN BAG STATLOCK 300316A

## (undated) DEVICE — BLADE CLIPPER 4406

## (undated) DEVICE — SU MONOCRYL 4-0 PS-2 18" UND Y496G

## (undated) DEVICE — PREP CHLORAPREP 26ML TINTED HI-LITE ORANGE 930815

## (undated) DEVICE — SOL NACL 0.9% IRRIG 1000ML BOTTLE 07138-09

## (undated) DEVICE — SU DERMABOND ADVANCED .7ML DNX12

## (undated) DEVICE — DECANTER BAG 2002S

## (undated) DEVICE — SU VICRYL 0 CT 36" J358H

## (undated) DEVICE — SU PDS II 0 CT 36" Z358T

## (undated) DEVICE — PACK C-SECTION LF PL15OTA83B

## (undated) DEVICE — LINEN C-SECTION 5415

## (undated) RX ORDER — LIDOCAINE HCL/EPINEPHRINE/PF 2%-1:200K
VIAL (ML) INJECTION
Status: DISPENSED
Start: 2023-10-15

## (undated) RX ORDER — OXYTOCIN/0.9 % SODIUM CHLORIDE 30/500 ML
PLASTIC BAG, INJECTION (ML) INTRAVENOUS
Status: DISPENSED
Start: 2023-10-15

## (undated) RX ORDER — MORPHINE SULFATE 1 MG/ML
INJECTION, SOLUTION EPIDURAL; INTRATHECAL; INTRAVENOUS
Status: DISPENSED
Start: 2023-10-15